# Patient Record
Sex: FEMALE | Race: BLACK OR AFRICAN AMERICAN | Employment: FULL TIME | URBAN - METROPOLITAN AREA
[De-identification: names, ages, dates, MRNs, and addresses within clinical notes are randomized per-mention and may not be internally consistent; named-entity substitution may affect disease eponyms.]

---

## 2017-04-07 ENCOUNTER — OFFICE VISIT (OUTPATIENT)
Dept: NEUROLOGY | Age: 32
End: 2017-04-07

## 2017-04-07 VITALS
HEART RATE: 73 BPM | OXYGEN SATURATION: 98 % | SYSTOLIC BLOOD PRESSURE: 130 MMHG | DIASTOLIC BLOOD PRESSURE: 84 MMHG | WEIGHT: 265 LBS

## 2017-04-07 DIAGNOSIS — G60.3 IDIOPATHIC PROGRESSIVE NEUROPATHY: ICD-10-CM

## 2017-04-07 DIAGNOSIS — E55.9 VITAMIN D DEFICIENCY: ICD-10-CM

## 2017-04-07 DIAGNOSIS — N30.00 ACUTE CYSTITIS WITHOUT HEMATURIA: ICD-10-CM

## 2017-04-07 DIAGNOSIS — G35 MULTIPLE SCLEROSIS (HCC): Primary | ICD-10-CM

## 2017-04-07 DIAGNOSIS — E53.8 B12 DEFICIENCY: ICD-10-CM

## 2017-04-07 DIAGNOSIS — M32.19 OTHER SYSTEMIC LUPUS ERYTHEMATOSUS WITH OTHER ORGAN INVOLVEMENT (HCC): ICD-10-CM

## 2017-04-07 NOTE — MR AVS SNAPSHOT
Visit Information Date & Time Provider Department Dept. Phone Encounter #  
 4/7/2017 10:00 AM Patricio Elena MD Neurology Clinic at [facility] [phone] [id number] Follow-up Instructions Return in about 3 weeks (around 4/28/2017) for EMG. Upcoming Health Maintenance Date Due DTaP/Tdap/Td series (1 - Tdap) 3/9/2005 PAP AKA CERVICAL CYTOLOGY 3/9/2005 INFLUENZA AGE 9 TO ADULT 8/1/2016 Allergies as of 4/7/2017  Review Complete On: 4/7/2017 By: Reynaldo Blood Not on File Current Immunizations  Never Reviewed No immunizations on file. Not reviewed this visit You Were Diagnosed With   
  
 Codes Comments Multiple sclerosis ([facility].)    -  Primary ICD-10-CM: G35 
ICD-9-CM: 403 Other systemic lupus erythematosus with other organ involvement ([facility]     ICD-10-CM: M32.19   
 B12 deficiency     ICD-10-CM: E53.8 ICD-9-CM: 266.2 Vitamin D deficiency     ICD-10-CM: E55.9 ICD-9-CM: 268.9 Idiopathic progressive neuropathy     ICD-10-CM: G60.3 ICD-9-CM: 356.4 Acute cystitis without hematuria     ICD-10-CM: N30.00 ICD-9-CM: 595.0 Vitals BP Pulse Weight(growth percentile) SpO2 Smoking Status 130/84 73 265 lb (120.2 kg) 98% Former Smoker Preferred Pharmacy Pharmacy Name Phone [facility] [address] [facility] [phone] Your Updated Medication List  
  
   
This list is accurate as of: 4/7/17 11:09 AM.  Always use your most recent med list.  
  
  
  
  
 Leonardo Hug Take  by mouth. SPRINTEC (28) PO Take  by mouth. ZYRTEC PO Take  by mouth. We Performed the Following KANDACE, DIRECT, W/REFLEX [id number] CPT(R)] REFERRAL TO NEUROLOGY [MRW49 Custom] Comments:  
 Please do EMG/NCS of right/left arm/leg to eval for neuropathy or radiculopathy URINALYSIS W/ RFLX MICROSCOPIC [79204 CPT(R)] VITAMIN B12 & FOLATE [70705 CPT(R)] VITAMIN D, 25 HYDROXY [id number] CPT(R)] Follow-up Instructions Return in about 3 weeks (around 4/28/2017) for EMG. To-Do List   
 04/07/2017 Neurology:  EMG NCV MOTOR WITH F/WAVE PER NERVE   
  
 04/07/2017 Imaging:  MRI BRAIN W WO CONT   
  
 04/07/2017 Imaging:  MRI CERV SPINE W WO CONT Referral Information Referral ID Referred By Referred To  
  
 [id number] Migdalia LEIVA MD   
   [address] [facility] [place] [address] [address] , [address] Phone: [phone] Fax: [phone] Visits Status Start Date End Date 1 New Request 4/7/17 4/7/18 If your referral has a status of pending review or denied, additional information will be sent to support the outcome of this decision. Patient Instructions A Healthy Lifestyle: Care Instructions Your Care Instructions A healthy lifestyle can help you feel good, stay at a healthy weight, and have plenty of energy for both work and play. A healthy lifestyle is something you can share with your whole family. A healthy lifestyle also can lower your risk for serious health problems, such as high blood pressure, heart disease, and diabetes. You can follow a few steps listed below to improve your health and the health of your family. Follow-up care is a key part of your treatment and safety. Be sure to make and go to all appointments, and call your doctor if you are having problems. Its also a good idea to know your test results and keep a list of the medicines you take. How can you care for yourself at home? · Do not eat too much sugar, fat, or fast foods. You can still have dessert and treats now and then. The goal is moderation. · Start small to improve your eating habits. Pay attention to portion sizes, drink less juice and soda pop, and eat more fruits and vegetables. ¨ Eat a healthy amount of food. A 3-ounce serving of meat, for example, is about the size of a deck of cards. Fill the rest of your plate with vegetables and whole grains. ¨ Limit the amount of soda and sports drinks you have every day. Drink more water when you are thirsty. ¨ Eat at least 5 servings of fruits and vegetables every day. It may seem like a lot, but it is not hard to reach this goal. A serving or helping is 1 piece of fruit, 1 cup of vegetables, or 2 cups of leafy, raw vegetables. Have an apple or some carrot sticks as an afternoon snack instead of a candy bar. Try to have fruits and/or vegetables at every meal. 
· Make exercise part of your daily routine. You may want to start with simple activities, such as walking, bicycling, or slow swimming. Try to be active 30 to 60 minutes every day. You do not need to do all 30 to 60 minutes all at once. For example, you can exercise 3 times a day for 10 or 20 minutes. Moderate exercise is safe for most people, but it is always a good idea to talk to your doctor before starting an exercise program. 
· Keep moving. [place] [employer] the lawn, work in the garden, or [employer]. Take the stairs instead of the elevator at work. · If you smoke, quit. People who smoke have an increased risk for heart attack, stroke, cancer, and other lung illnesses. Quitting is hard, but there are ways to boost your chance of quitting tobacco for good. ¨ Use nicotine gum, patches, or lozenges. ¨ Ask your doctor about stop-smoking programs and medicines. ¨ Keep trying. In addition to reducing your risk of diseases in the future, you will notice some benefits soon after you stop using tobacco. If you have shortness of breath or asthma symptoms, they will likely get better within a few weeks after you quit. · Limit how much alcohol you drink. Moderate amounts of alcohol (up to 2 drinks a day for men, 1 drink a day for women) are okay.  But drinking too much can lead to liver problems, high blood pressure, and other health problems. Family health If you have a family, there are many things you can do together to improve your health. · Eat meals together as a family as often as possible. · Eat healthy foods. This includes fruits, vegetables, lean meats and dairy, and whole grains. · Include your family in your fitness plan. Most people think of activities such as jogging or tennis as the way to fitness, but there are many ways you and your family can be more active. Anything that makes you breathe hard and gets your heart pumping is exercise. Here are some tips: 
¨ Walk to do errands or to take your child to school or the bus. ¨ Go for a family bike ride after dinner instead of watching TV. Where can you learn more? Go to http://howie-sherin.info/. Enter E999 in the search box to learn more about \"A Healthy Lifestyle: Care Instructions. \" Current as of: July 26, 2016 Content Version: 11.2 © 0954-4908 [employer]. Care instructions adapted under license by [employer] (which disclaims liability or warranty for this information). If you have questions about a medical condition or this instruction, always ask your healthcare professional. [place] [address] any warranty or liability for your use of this information. Introducing [facility] & HEALTH SERVICES! [facility] introduces [employer] patient portal. Now you can access parts of your medical record, email your doctor's office, and request medication refills online. 1. In your internet browser, go to [email or web address] 2. Click on the First Time User? Click Here link in the Sign In box. You will see the New Member Sign Up page. 3. Enter your [employer] Access Code exactly as it appears below. You will not need to use this code after youve completed the sign-up process.  If you do not sign up before the expiration date, you must request a new code. · Sessions Access Code: [email or web address] Expires: 7/6/2017  9:47 AM 
 
4. Enter the last four digits of your Social Security Number (xxxx) and Date of Birth (mm/dd/yyyy) as indicated and click Submit. You will be taken to the next sign-up page. 5. Create a Sessions ID. This will be your Sessions login ID and cannot be changed, so think of one that is secure and easy to remember. 6. Create a Sessions password. You can change your password at any time. 7. Enter your Password Reset Question and Answer. This can be used at a later time if you forget your password. 8. Enter your e-mail address. You will receive e-mail notification when new information is available in [address] 9. Click Sign Up. You can now view and download portions of your medical record. 10. Click the Download Summary menu link to download a portable copy of your medical information. If you have questions, please visit the Frequently Asked Questions section of the Sessions website. Remember, Sessions is NOT to be used for urgent needs. For medical emergencies, dial 911. Now available from your iPhone and Android! Please provide this summary of care documentation to your next provider. If you have any questions after today's visit, please call [phone].

## 2017-04-07 NOTE — PATIENT INSTRUCTIONS

## 2017-04-07 NOTE — PROGRESS NOTES
NEUROLOGY HISTORY AND PHYSICAL    Name Johnathan Vela Age 34 y.o. MRN [id number]  1985       Chief Complaint: sensory loss     This is a pleasant  34 y.o. right handed female with a history of transverse myelitis which occurred in  and presented as extreme fatigue and weakness. She was unable to raise arms and could not walk. Went to [facility] . Received MRI at spinal tap and found to have transverse myelitis. Recovered over 5 days. She was treated with solumederol at the time. She is now complaining of a burning sensation in the upper left leg. She also has been having urinary incontinence at times. She sometimes has to go to the bathroom four or five times a night. She additionally has lower extremity sensory loss. She was recently tested for lyme disease. I have personally reviewed the chart   I have personally reviewed available imaging   I have the reviewed the available laboratory results. Assessment and Plan     1. Multiple sclerosis (HCC)  Condition is not clear. Usually transverse myelitis does not follow this patterns of presentation which is more typical of MS  - MRI BRAIN W WO CONT; Future  - MRI CERV SPINE W WO CONT; Future    2. Other systemic lupus erythematosus with other organ involvement (HCC)  - KANDACE, DIRECT, W/REFLEX    3. B12 deficiency  - VITAMIN B12 & FOLATE    4. Vitamin D deficiency  - VITAMIN D, 25 HYDROXY    5. Idiopathic progressive neuropathy  - REFERRAL TO NEUROLOGY  - EMG NCV MOTOR WITH F/WAVE PER NERVE; Future    6. Acute cystitis without hematuria    - URINALYSIS W/ RFLX MICROSCOPIC      Patient Allergies    Review of patient's allergies indicates not on file. Current Outpatient Prescriptions   Medication Sig    NORGESTIMATE-ETHINYL ESTRADIOL (SPRINTEC, 28, PO) Take  by mouth.  CETIRIZINE HCL (ZYRTEC PO) Take  by mouth.  ESCITALOPRAM OXALATE (LEXAPRO PO) Take  by mouth. No current facility-administered medications for this visit.         Past Medical History:   Diagnosis Date    Anxiety     Fatigue     Hearing loss     Incontinence     Joint pain     Migraine     Muscle pain     Muscle weakness     Ringing in the ears        Social History   Substance Use Topics    Smoking status: Former Smoker    Smokeless tobacco: Not on file    Alcohol use Yes       Family History  No multiple sclerosis  No seizures. Review of Systems   Constitutional: Negative for chills and fever. HENT: Negative for ear pain. Eyes: Negative for pain and discharge. Respiratory: Negative for cough and hemoptysis. Cardiovascular: Negative for chest pain and claudication. Gastrointestinal: Negative for constipation and diarrhea. Genitourinary: Positive for urgency. Negative for flank pain and hematuria. Musculoskeletal: Negative for back pain and myalgias. Skin: Negative for itching and rash. Neurological: Positive for tingling and sensory change. Negative for headaches. Endo/Heme/Allergies: Negative for environmental allergies. Does not bruise/bleed easily. Psychiatric/Behavioral: Negative for depression and hallucinations. Visit Vitals    /84    Pulse 73    Wt 265 lb (120.2 kg)    SpO2 98%      Physical Exam   Constitutional: She is oriented to person, place, and time and well-developed, well-nourished, and in no distress. HENT:   Head: Normocephalic and atraumatic. Eyes: Conjunctivae and EOM are normal. Pupils are equal, round, and reactive to light. Neck: Neck supple. No JVD present. Carotid bruit is not present. No thyromegaly present. Cardiovascular: Normal rate, regular rhythm and normal heart sounds. Pulmonary/Chest: Effort normal and breath sounds normal. No respiratory distress. She has no wheezes. She has no rales. Abdominal: Soft. Bowel sounds are normal. She exhibits no distension. There is no tenderness. Neurological: She is alert and oriented to person, place, and time.  She has normal strength, normal reflexes and intact cranial nerves. Gait normal.   Eyes: PERRLA, Conjugate eye movements with no Nystagmus or ptosis  Sensory :Preceives crude touch and vibration in a symmetric fashion in proximal and distal limbs. Gait is well balanced with good arm swing. Skin: No rash noted. No erythema.

## 2017-04-25 LAB
APPEARANCE UR: CLEAR
BACTERIA #/AREA URNS HPF: NORMAL /[HPF]
BILIRUB UR QL STRIP: NEGATIVE
CASTS URNS QL MICRO: NORMAL /LPF
COLOR UR: YELLOW
EPI CELLS #/AREA URNS HPF: NORMAL /HPF
GLUCOSE UR QL: NEGATIVE
HGB UR QL STRIP: ABNORMAL
KETONES UR QL STRIP: NEGATIVE
LEUKOCYTE ESTERASE UR QL STRIP: NEGATIVE
MICRO URNS: ABNORMAL
MUCOUS THREADS URNS QL MICRO: PRESENT
NITRITE UR QL STRIP: NEGATIVE
PH UR STRIP: 6.5 [PH] (ref 5–7.5)
PROT UR QL STRIP: NEGATIVE
RBC #/AREA URNS HPF: NORMAL /HPF
SP GR UR: 1.01 (ref 1–1.03)
UROBILINOGEN UR STRIP-MCNC: 0.2 MG/DL (ref 0.2–1)
WBC #/AREA URNS HPF: NORMAL /HPF

## 2017-04-26 LAB
25(OH)D3+25(OH)D2 SERPL-MCNC: 21.9 NG/ML (ref 30–100)
ANA SER QL: NEGATIVE
FOLATE SERPL-MCNC: >20 NG/ML
VIT B12 SERPL-MCNC: 223 PG/ML (ref 211–946)

## 2017-07-10 ENCOUNTER — OFFICE VISIT (OUTPATIENT)
Dept: NEUROLOGY | Age: 32
End: 2017-07-10

## 2017-07-10 VITALS — SYSTOLIC BLOOD PRESSURE: 121 MMHG | DIASTOLIC BLOOD PRESSURE: 76 MMHG | HEART RATE: 75 BPM | OXYGEN SATURATION: 96 %

## 2017-07-10 DIAGNOSIS — G37.3 ACUTE TRANSVERSE MYELITIS (HCC): Primary | ICD-10-CM

## 2017-07-10 NOTE — MR AVS SNAPSHOT
Visit Information Date & Time Provider Department Dept. Phone Encounter #  
 7/10/2017  8:00 AM Sebastien Pemberton MD Neurology Clinic at [facility] [phone] [id number] Follow-up Instructions Return in about 2 months (around 9/10/2017) for transverse myelitis. Upcoming Health Maintenance Date Due DTaP/Tdap/Td series (1 - Tdap) 3/9/2005 PAP AKA CERVICAL CYTOLOGY 3/9/2005 INFLUENZA AGE 9 TO ADULT 8/1/2017 Allergies as of 7/10/2017  Review Complete On: 7/10/2017 By: Sebastien Pemberton MD  
 Not on File Current Immunizations  Never Reviewed No immunizations on file. Not reviewed this visit You Were Diagnosed With   
  
 Codes Comments [facility] myel[facility] ([facility]    -  Primary ICD-10-CM: G37.3 ICD-9-CM: 341.20 Vitals Smoking Status Former Smoker Preferred Pharmacy Pharmacy Name Phone Eduardo Banks [facility] [phone] Your Updated Medication List  
  
   
This list is accurate as of: 7/10/17  9:06 AM.  Always use your most recent med list.  
  
  
  
  
 Traore Ronde Take  by mouth. SPRINTEC (28) PO Take  by mouth. ZYRTEC PO Take  by mouth. Follow-up Instructions Return in about 2 months (around 9/10/2017) for transverse myelitis. Introducing [facility] & HEALTH SERVICES! Kassy Nath introduces [employer] patient portal. Now you can access parts of your medical record, email your doctor's office, and request medication refills online. 1. In your internet browser, go to [email or web address] 2. Click on the First Time User? Click Here link in the Sign In box. You will see the New Member Sign Up page. 3. Enter your [employer] Access Code exactly as it appears below. You will not need to use this code after youve completed the sign-up process.  If you do not sign up before the expiration date, you must request a new code. · [employer] Access Code: RACIEL MACIAS [facility] Expires: 10/8/2017  9:06 AM 
 
4. Enter the last four digits of your Social Security Number (xxxx) and Date of Birth (mm/dd/yyyy) as indicated and click Submit. You will be taken to the next sign-up page. 5. Create a [employer] ID. This will be your [employer] login ID and cannot be changed, so think of one that is secure and easy to remember. 6. Create a [employer] password. You can change your password at any time. 7. Enter your Password Reset Question and Answer. This can be used at a later time if you forget your password. 8. Enter your e-mail address. You will receive e-mail notification when new information is available in [address] 9. Click Sign Up. You can now view and download portions of your medical record. 10. Click the Download Summary menu link to download a portable copy of your medical information. If you have questions, please visit the Frequently Asked Questions section of the [employer] website. Remember, [employer] is NOT to be used for urgent needs. For medical emergencies, dial 911. Now available from your iPhone and Android! Please provide this summary of care documentation to your next provider. If you have any questions after today's visit, please call [phone].

## 2017-07-10 NOTE — PROCEDURES
[facility] Neurology  [address] [facility]    Electrodiagnostic Study Report    Test Date:  7/10/2017    Patient: Anastacio Chau : 1985 Physician: Hallie Kingsley M.D.   ID#: [id number] SEX: Female Ref. Phys: Hallie Kingsley M.D. Patient History / Exam:  Burning in upper left leg and lower extremity sensory loss, r/o neuropathy    EMG & NCV Findings:  Evaluation of the right Fibular motor nerve showed normal distal onset latency (4.0 ms), normal amplitude (5.3 mV), normal conduction velocity (B Fib-Ankle, 49 m/s), and normal conduction velocity (Poplt-B Fib, 56 m/s). The right median motor nerve showed normal distal onset latency (3.4 ms), normal amplitude (11.1 mV), and normal conduction velocity (Elbow-Wrist, 56 m/s). The right tibial motor nerve showed normal distal onset latency (3.4 ms), normal amplitude (10.0 mV), and normal conduction velocity (Knee-Ankle, 42 m/s). The right ulnar motor nerve showed normal distal onset latency (2.5 ms), normal amplitude (7.9 mV), normal conduction velocity (B Elbow-Wrist, 63 m/s), and normal conduction velocity (A Elbow-B Elbow, 59 m/s). The right median sensory, the right radial sensory, the right sural sensory, and the right ulnar sensory nerves showed normal distal peak latency (R3.1, R2.2, R3.4, R2.5 ms) and normal amplitude (R66.7, R41.2, R21.5, R65.1 µV). The right Sup Fibular sensory nerve showed normal distal peak latency (2.4 ms), normal amplitude (30.7 µV), and normal conduction velocity (Lower leg-Lat ankle, 56 m/s). All examined muscles (as indicated in the following table) showed no evidence of electrical instability.       Impression  Normal Study        ___________________________  Hallie Kingsley M.D.        Nerve Conduction Studies  Anti Sensory Summary Table     Stim Site NR Peak (ms) Norm Peak (ms) P-T Amp (µV) Norm P-T Amp Site1 Site2 Dist (cm)   Right Median Anti Sensory (2nd Digit)  34.4°C   Wrist    3.1 <4 66.7 >13 Wrist 2nd Digit 14.0   Right Radial Anti Sensory (Base 1st Digit)  33.6°C   Wrist    2.2 <2.8 41.2 >11 Wrist Base 1st Digit 10.0   Right Sup Fibular Anti Sensory (Lat ankle)  31.5°C   Lower leg    2.4 <4.5 30.7 >5 Lower leg Lat ankle 10.0   Right Sural Anti Sensory (Lat Mall)  31.3°C   Calf    3.4 <4.5 21.5 >4.0 Calf Lat Mall 14.0   Right Ulnar Anti Sensory (5th Digit)  33.9°C   Wrist    2.5 <4.0 65.1 >9 Wrist 5th Digit 14.0     Motor Summary Table     Stim Site NR Onset (ms) Norm Onset (ms) O-P Amp (mV) Norm O-P Amp P-T Amp (mV) Site1 Site2 Dist (cm) Santana (m/s)   Right Fibular Motor (Ext Dig Brev)  31.3°C   Ankle    4.0 <6.5 5.3 >2.6 8.0 Ankle Ext Dig Brev 8.0    B Fib    10.3  5.2  7.4 B Fib Ankle 31.0 49   Poplt    12.1  4.9  7.0 Poplt B Fib 10.0 56   Right Median Motor (Abd Poll Brev)  33.6°C   Wrist    3.4 <4.5 11.1 >4.1 18.2 Wrist Abd Poll Brev 8.0    Elbow    7.9  10.3  16.7 Elbow Wrist 25.0 56   Right Tibial Motor (Abd Son Brev)  31.6°C   Ankle    3.4 <6.1 10.0 >5.3 17.0 Ankle Abd Son Brev 8.0    Knee    12.4  9.2  15.0 Knee Ankle 37.5 42   Right Ulnar Motor (Abd Dig Minimi)  34.1°C   Wrist    2.5 <3.1 7.9 >7.0 14.3 Wrist Abd Dig Minimi 8.0    B Elbow    5.6  7.6  13.3 B Elbow Wrist 19.5 63   A Elbow    7.3  7.5  13.3 A Elbow B Elbow 10.0 59       EMG     Side Muscle Nerve Root Ins Act Fibs Psw Recrt Duration Amp Poly Comment   Right Ext Dig Brev Dp Br Peron L5, S1 Nml Nml Nml Nml Nml Nml Nml    Right AbdHallucis MedPlantar S1-2 Nml Nml Nml Nml Nml Nml Nml    Right AntTibialis Dp Br Peron L4-5 Nml Nml Nml Nml Nml Nml Nml    Right MedGastroc Tibial S1-2 Nml Nml Nml Nml Nml Nml Nml    Right VastusLat Femoral L2-4 Nml Nml Nml Nml Nml Nml Nml          Waveforms:

## 2020-01-30 ENCOUNTER — HOSPITAL ENCOUNTER (EMERGENCY)
Age: 35
Discharge: STILL ADMITTED OR ADMINISTRATIVE | End: 2020-01-30
Payer: COMMERCIAL

## 2020-01-30 ENCOUNTER — HOSPITAL ENCOUNTER (INPATIENT)
Age: 35
LOS: 2 days | Discharge: HOME | DRG: 123 | End: 2020-02-01
Attending: EMERGENCY MEDICINE | Admitting: INTERNAL MEDICINE
Payer: COMMERCIAL

## 2020-01-30 ENCOUNTER — APPOINTMENT (OUTPATIENT)
Dept: MRI IMAGING | Age: 35
DRG: 123 | End: 2020-01-30
Attending: EMERGENCY MEDICINE
Payer: COMMERCIAL

## 2020-01-30 DIAGNOSIS — H47.10 PAPILLEDEMA OF LEFT EYE: ICD-10-CM

## 2020-01-30 DIAGNOSIS — H54.62 VISION LOSS OF LEFT EYE: Primary | ICD-10-CM

## 2020-01-30 DIAGNOSIS — G37.3 IDIOPATHIC TRANSVERSE MYELITIS (HCC): ICD-10-CM

## 2020-01-30 LAB
ALBUMIN SERPL-MCNC: 3.4 G/DL (ref 3.5–5)
ALBUMIN/GLOB SERPL: 0.8 {RATIO} (ref 1.1–2.2)
ALP SERPL-CCNC: 84 U/L (ref 45–117)
ALT SERPL-CCNC: 35 U/L (ref 12–78)
ANION GAP SERPL CALC-SCNC: 6 MMOL/L (ref 5–15)
AST SERPL-CCNC: 34 U/L (ref 15–37)
BASOPHILS # BLD: 0.1 K/UL (ref 0–0.1)
BASOPHILS NFR BLD: 1 % (ref 0–1)
BILIRUB SERPL-MCNC: 0.4 MG/DL (ref 0.2–1)
BUN SERPL-MCNC: 8 MG/DL (ref 6–20)
BUN/CREAT SERPL: 11 (ref 12–20)
CALCIUM SERPL-MCNC: 8.6 MG/DL (ref 8.5–10.1)
CHLORIDE SERPL-SCNC: 105 MMOL/L (ref 97–108)
CO2 SERPL-SCNC: 25 MMOL/L (ref 21–32)
COMMENT, HOLDF: NORMAL
CREAT SERPL-MCNC: 0.75 MG/DL (ref 0.55–1.02)
DIFFERENTIAL METHOD BLD: NORMAL
EOSINOPHIL # BLD: 0.1 K/UL (ref 0–0.4)
EOSINOPHIL NFR BLD: 2 % (ref 0–7)
ERYTHROCYTE [DISTWIDTH] IN BLOOD BY AUTOMATED COUNT: 12.4 % (ref 11.5–14.5)
ERYTHROCYTE [SEDIMENTATION RATE] IN BLOOD: 2 MM/HR (ref 0–20)
EST. AVERAGE GLUCOSE BLD GHB EST-MCNC: 97 MG/DL
GLOBULIN SER CALC-MCNC: 4.1 G/DL (ref 2–4)
GLUCOSE BLD STRIP.AUTO-MCNC: 100 MG/DL (ref 65–100)
GLUCOSE SERPL-MCNC: 81 MG/DL (ref 65–100)
HBA1C MFR BLD: 5 % (ref 4–5.6)
HCG SERPL QL: NEGATIVE
HCT VFR BLD AUTO: 39.2 % (ref 35–47)
HGB BLD-MCNC: 12.9 G/DL (ref 11.5–16)
IMM GRANULOCYTES # BLD AUTO: 0 K/UL (ref 0–0.04)
IMM GRANULOCYTES NFR BLD AUTO: 0 % (ref 0–0.5)
LYMPHOCYTES # BLD: 2.2 K/UL (ref 0.8–3.5)
LYMPHOCYTES NFR BLD: 32 % (ref 12–49)
MCH RBC QN AUTO: 30.1 PG (ref 26–34)
MCHC RBC AUTO-ENTMCNC: 32.9 G/DL (ref 30–36.5)
MCV RBC AUTO: 91.4 FL (ref 80–99)
MONOCYTES # BLD: 0.5 K/UL (ref 0–1)
MONOCYTES NFR BLD: 8 % (ref 5–13)
NEUTS SEG # BLD: 3.9 K/UL (ref 1.8–8)
NEUTS SEG NFR BLD: 57 % (ref 32–75)
NRBC # BLD: 0 K/UL (ref 0–0.01)
NRBC BLD-RTO: 0 PER 100 WBC
PLATELET # BLD AUTO: 186 K/UL (ref 150–400)
PMV BLD AUTO: 10.7 FL (ref 8.9–12.9)
POTASSIUM SERPL-SCNC: 4.1 MMOL/L (ref 3.5–5.1)
PROT SERPL-MCNC: 7.5 G/DL (ref 6.4–8.2)
RBC # BLD AUTO: 4.29 M/UL (ref 3.8–5.2)
RBC MORPH BLD: NORMAL
SAMPLES BEING HELD,HOLD: NORMAL
SERVICE CMNT-IMP: NORMAL
SODIUM SERPL-SCNC: 136 MMOL/L (ref 136–145)
WBC # BLD AUTO: 6.8 K/UL (ref 3.6–11)

## 2020-01-30 PROCEDURE — 83520 IMMUNOASSAY QUANT NOS NONAB: CPT

## 2020-01-30 PROCEDURE — 70553 MRI BRAIN STEM W/O & W/DYE: CPT

## 2020-01-30 PROCEDURE — 74011250637 HC RX REV CODE- 250/637: Performed by: INTERNAL MEDICINE

## 2020-01-30 PROCEDURE — A9575 INJ GADOTERATE MEGLUMI 0.1ML: HCPCS | Performed by: EMERGENCY MEDICINE

## 2020-01-30 PROCEDURE — 80053 COMPREHEN METABOLIC PANEL: CPT

## 2020-01-30 PROCEDURE — 74011250636 HC RX REV CODE- 250/636: Performed by: INTERNAL MEDICINE

## 2020-01-30 PROCEDURE — 85652 RBC SED RATE AUTOMATED: CPT

## 2020-01-30 PROCEDURE — 74011000258 HC RX REV CODE- 258: Performed by: INTERNAL MEDICINE

## 2020-01-30 PROCEDURE — 82164 ANGIOTENSIN I ENZYME TEST: CPT

## 2020-01-30 PROCEDURE — 84703 CHORIONIC GONADOTROPIN ASSAY: CPT

## 2020-01-30 PROCEDURE — 99284 EMERGENCY DEPT VISIT MOD MDM: CPT

## 2020-01-30 PROCEDURE — 65660000000 HC RM CCU STEPDOWN

## 2020-01-30 PROCEDURE — 74011250636 HC RX REV CODE- 250/636: Performed by: EMERGENCY MEDICINE

## 2020-01-30 PROCEDURE — 85025 COMPLETE CBC W/AUTO DIFF WBC: CPT

## 2020-01-30 PROCEDURE — 75810000275 HC EMERGENCY DEPT VISIT NO LEVEL OF CARE

## 2020-01-30 PROCEDURE — 82962 GLUCOSE BLOOD TEST: CPT

## 2020-01-30 PROCEDURE — 36415 COLL VENOUS BLD VENIPUNCTURE: CPT

## 2020-01-30 PROCEDURE — 83036 HEMOGLOBIN GLYCOSYLATED A1C: CPT

## 2020-01-30 RX ORDER — ESCITALOPRAM OXALATE 10 MG/1
20 TABLET ORAL DAILY
COMMUNITY

## 2020-01-30 RX ORDER — SODIUM CHLORIDE 9 MG/ML
100 INJECTION, SOLUTION INTRAVENOUS CONTINUOUS
Status: DISCONTINUED | OUTPATIENT
Start: 2020-01-30 | End: 2020-02-01 | Stop reason: HOSPADM

## 2020-01-30 RX ORDER — MAGNESIUM SULFATE 100 %
4 CRYSTALS MISCELLANEOUS AS NEEDED
Status: DISCONTINUED | OUTPATIENT
Start: 2020-01-30 | End: 2020-02-01 | Stop reason: HOSPADM

## 2020-01-30 RX ORDER — GADOTERATE MEGLUMINE 376.9 MG/ML
20 INJECTION INTRAVENOUS
Status: COMPLETED | OUTPATIENT
Start: 2020-01-30 | End: 2020-01-30

## 2020-01-30 RX ORDER — NORGESTIMATE AND ETHINYL ESTRADIOL 0.25-0.035
1 KIT ORAL DAILY
COMMUNITY

## 2020-01-30 RX ORDER — INSULIN LISPRO 100 [IU]/ML
INJECTION, SOLUTION INTRAVENOUS; SUBCUTANEOUS
Status: DISCONTINUED | OUTPATIENT
Start: 2020-01-30 | End: 2020-02-01 | Stop reason: HOSPADM

## 2020-01-30 RX ORDER — FAMOTIDINE 20 MG/1
20 TABLET, FILM COATED ORAL 2 TIMES DAILY
Status: DISCONTINUED | OUTPATIENT
Start: 2020-01-30 | End: 2020-02-01 | Stop reason: HOSPADM

## 2020-01-30 RX ORDER — CETIRIZINE HCL 10 MG
10 TABLET ORAL DAILY
COMMUNITY

## 2020-01-30 RX ORDER — SODIUM CHLORIDE 0.9 % (FLUSH) 0.9 %
10 SYRINGE (ML) INJECTION
Status: COMPLETED | OUTPATIENT
Start: 2020-01-30 | End: 2020-01-30

## 2020-01-30 RX ORDER — ONDANSETRON 2 MG/ML
4 INJECTION INTRAMUSCULAR; INTRAVENOUS
Status: DISCONTINUED | OUTPATIENT
Start: 2020-01-30 | End: 2020-02-01 | Stop reason: HOSPADM

## 2020-01-30 RX ORDER — SODIUM CHLORIDE 0.9 % (FLUSH) 0.9 %
5-40 SYRINGE (ML) INJECTION AS NEEDED
Status: DISCONTINUED | OUTPATIENT
Start: 2020-01-30 | End: 2020-02-01 | Stop reason: HOSPADM

## 2020-01-30 RX ORDER — SODIUM CHLORIDE 0.9 % (FLUSH) 0.9 %
5-40 SYRINGE (ML) INJECTION EVERY 8 HOURS
Status: DISCONTINUED | OUTPATIENT
Start: 2020-01-30 | End: 2020-02-01 | Stop reason: HOSPADM

## 2020-01-30 RX ORDER — DEXTROSE 50 % IN WATER (D50W) INTRAVENOUS SYRINGE
25-50 AS NEEDED
Status: DISCONTINUED | OUTPATIENT
Start: 2020-01-30 | End: 2020-01-30 | Stop reason: RX

## 2020-01-30 RX ORDER — ACETAMINOPHEN 325 MG/1
650 TABLET ORAL
Status: DISCONTINUED | OUTPATIENT
Start: 2020-01-30 | End: 2020-02-01 | Stop reason: HOSPADM

## 2020-01-30 RX ADMIN — GADOTERATE MEGLUMINE 20 ML: 376.9 INJECTION INTRAVENOUS at 18:02

## 2020-01-30 RX ADMIN — FAMOTIDINE 20 MG: 20 TABLET ORAL at 22:07

## 2020-01-30 RX ADMIN — SODIUM CHLORIDE 100 ML/HR: 900 INJECTION, SOLUTION INTRAVENOUS at 22:13

## 2020-01-30 RX ADMIN — Medication 10 ML: at 22:13

## 2020-01-30 RX ADMIN — SODIUM CHLORIDE 1000 MG: 900 INJECTION, SOLUTION INTRAVENOUS at 22:09

## 2020-01-30 RX ADMIN — Medication 10 ML: at 18:02

## 2020-01-30 NOTE — ED PROVIDER NOTES
HPI     26 yo female with history of transverse myelitis and others referred from ophthalmology office Dr. Ayla Miller for MRI and further work-up. Patient states that she has had left eye vision changes with pain x 2 weeks. Right eye has been dead for many years related to the transverse myelitis. Today was her first time seeing the ophthalmologist.  Denies headache, other focal weakness or numbness, fevers or other complaints. Dr. Henry Florian - past neurology. SHX:  Former smoker. + ETOH. Past Medical History:   Diagnosis Date    Acute transverse myelitis (HCC)     Anxiety     Fatigue     Hearing loss     Incontinence     Joint pain     Migraine     Muscle pain     Muscle weakness     Ringing in the ears        No past surgical history on file. No family history on file. Social History     Socioeconomic History    Marital status: SINGLE     Spouse name: Not on file    Number of children: Not on file    Years of education: Not on file    Highest education level: Not on file   Occupational History    Not on file   Social Needs    Financial resource strain: Not on file    Food insecurity:     Worry: Not on file     Inability: Not on file    Transportation needs:     Medical: Not on file     Non-medical: Not on file   Tobacco Use    Smoking status: Former Smoker    Smokeless tobacco: Never Used   Substance and Sexual Activity    Alcohol use:  Yes    Drug use: Yes     Types: Marijuana    Sexual activity: Yes     Birth control/protection: Pill   Lifestyle    Physical activity:     Days per week: Not on file     Minutes per session: Not on file    Stress: Not on file   Relationships    Social connections:     Talks on phone: Not on file     Gets together: Not on file     Attends [employer] service: Not on file     Active member of club or organization: Not on file     Attends meetings of clubs or organizations: Not on file     Relationship status: Not on file    Intimate partner violence:     Fear of current or ex partner: Not on file     Emotionally abused: Not on file     Physically abused: Not on file     Forced sexual activity: Not on file   Other Topics Concern    Not on file   Social History Narrative    Not on file         ALLERGIES: Patient has no known allergies. Review of Systems   Constitutional: Negative for fever. Eyes: Positive for pain and visual disturbance. Gastrointestinal: Negative for vomiting. All other systems reviewed and are negative. Vitals:    01/30/20 1412   BP: 143/88   Pulse: 97   Resp: 16   Temp: 98.8 °F (37.1 °C)   SpO2: 99%            Physical Exam     Nursing note and vitals reviewed. Constitutional: appears well-developed and well-nourished. No distress. HENT:   Head: Normocephalic and atraumatic. Sclera anicteric  Nose: No rhinorrhea  Mouth/Throat: Oropharynx is clear and moist. Pharynx normal  Eyes: Conjunctivae are normal. Pupils are equal, round, and reactive to light. Right eye exhibits no discharge. Left eye exhibits no discharge. No scleral icterus. Neck: Painless normal range of motion. Supple  Cardiovascular: Normal rate, regular rhythm, normal heart sounds and intact distal pulses. Exam reveals no gallop and no friction rub. No murmur heard. Pulmonary/Chest: Effort normal and breath sounds normal. No respiratory distress. no wheezes. no rales. Abdominal: Soft. Bowel sounds are normal. Exhibits no distension and no mass. No tenderness. No guarding. Musculoskeletal: Normal range of motion. no tenderness. No edema  Lymphadenopathy:   No cervical adenopathy. Neurological:  Alert and oriented to person, place, and time. Coordination normal. CN 2-12 intact except vision in right eye. Moving all extremities. Skin: Skin is warm and dry. No rash noted. No pallor. MDM     Referred from ophthalmologist for MRI and labs. Optic neuropathy is a diagnosis by ophthalmology.   Ordered MRI and labs as requested by ophthalmologist.  Procedures      5:14 PM  D/W Dr. Hazel Santillan, optho. He called the ED. He states pt has papilledema of left eye and right optic nerve is atrophied. I updated him on the results. Pending MRI. He agrees with probable admission and neurology to see. 7:13 PM  Updated patient on results. Spoke with Dr. Miguelina Jimenez.  I reviewed the hx, exam and results with him. Dr. Siobhan Dominguez, hospitalist present. He gave recommendations to hospitalist.  Updated pt that she will be admitted. Hospitalist Rehan Serve for Admission  7:41 PM    ED Room Number: R31/R31  Patient Name and age:  Soniya Sosa 27 y.o.  female  Working Diagnosis:   1. Vision loss of left eye    2. Papilledema of left eye      Readmission: no  Isolation Requirements:  no  Recommended Level of Care:  med/surg  Code Status:  Full Code  Department:[facility] Adult ED - [phone]           Recent Results (from the past 24 hour(s))   METABOLIC PANEL, COMPREHENSIVE    Collection Time: 01/30/20  2:20 PM   Result Value Ref Range    Sodium 136 136 - 145 mmol/L    Potassium 4.1 3.5 - 5.1 mmol/L    Chloride 105 97 - 108 mmol/L    CO2 25 21 - 32 mmol/L    Anion gap 6 5 - 15 mmol/L    Glucose 81 65 - 100 mg/dL    BUN 8 6 - 20 MG/DL    Creatinine 0.75 0.55 - 1.02 MG/DL    BUN/Creatinine ratio 11 (L) 12 - 20      GFR est AA >60 >60 ml/min/1.73m2    GFR est non-AA >60 >60 ml/min/1.73m2    Calcium 8.6 8.5 - 10.1 MG/DL    Bilirubin, total 0.4 0.2 - 1.0 MG/DL    ALT (SGPT) 35 12 - 78 U/L    AST (SGOT) 34 15 - 37 U/L    Alk.  phosphatase 84 45 - 117 U/L    Protein, total 7.5 6.4 - 8.2 g/dL    Albumin 3.4 (L) 3.5 - 5.0 g/dL    Globulin 4.1 (H) 2.0 - 4.0 g/dL    A-G Ratio 0.8 (L) 1.1 - 2.2     SAMPLES BEING HELD    Collection Time: 01/30/20  2:20 PM   Result Value Ref Range    SAMPLES BEING HELD  1 RED, 1 BLUE     COMMENT        Add-on orders for these samples will be processed based on acceptable specimen integrity and analyte stability, which may vary by analyte. CBC WITH AUTOMATED DIFF    Collection Time: 01/30/20  2:57 PM   Result Value Ref Range    WBC 6.8 3.6 - 11.0 K/uL    RBC 4.29 3.80 - 5.20 M/uL    HGB 12.9 11.5 - 16.0 g/dL    HCT 39.2 35.0 - 47.0 %    MCV 91.4 80.0 - 99.0 FL    MCH 30.1 26.0 - 34.0 PG    MCHC 32.9 30.0 - 36.5 g/dL    RDW 12.4 11.5 - 14.5 %    PLATELET 193 904 - 736 K/uL    MPV 10.7 8.9 - 12.9 FL    NRBC 0.0 0  WBC    ABSOLUTE NRBC 0.00 0.00 - 0.01 K/uL    NEUTROPHILS 57 32 - 75 %    LYMPHOCYTES 32 12 - 49 %    MONOCYTES 8 5 - 13 %    EOSINOPHILS 2 0 - 7 %    BASOPHILS 1 0 - 1 %    IMMATURE GRANULOCYTES 0 0.0 - 0.5 %    ABS. NEUTROPHILS 3.9 1.8 - 8.0 K/UL    ABS. LYMPHOCYTES 2.2 0.8 - 3.5 K/UL    ABS. MONOCYTES 0.5 0.0 - 1.0 K/UL    ABS. EOSINOPHILS 0.1 0.0 - 0.4 K/UL    ABS. BASOPHILS 0.1 0.0 - 0.1 K/UL    ABS. IMM. GRANS. 0.0 0.00 - 0.04 K/UL    DF SMEAR SCANNED      RBC COMMENTS NORMOCYTIC, NORMOCHROMIC     HEMOGLOBIN A1C WITH EAG    Collection Time: 01/30/20  2:57 PM   Result Value Ref Range    Hemoglobin A1c 5.0 4.0 - 5.6 %    Est. average glucose 97 mg/dL   SED RATE (ESR)    Collection Time: 01/30/20  2:57 PM   Result Value Ref Range    Sed rate, automated 2 0 - 20 mm/hr       Mri Brain W Wo Cont    Result Date: 1/30/2020  *PRELIMINARY REPORT* MRI examination of the brain demonstrates atrophy of the right optic nerve but no evidence of nerve enhancement bilaterally or other acute finding. Preliminary report was provided by Dr. Olivia Olivares, the on-call radiologist, at 6:44 PM Final report to follow.  *END PRELIMINARY REPORT*

## 2020-01-30 NOTE — ED TRIAGE NOTES
Referred here by eye doctor for STAT MRI. Scheduled for MRI outpatient in February but [facility] told pt to not wait that long and go to ER>    Pt c/o eye pressure/pain behind LEFT eye. Denies fever. Denies nausea or numbness/tingling.

## 2020-01-31 ENCOUNTER — APPOINTMENT (OUTPATIENT)
Dept: MRI IMAGING | Age: 35
DRG: 123 | End: 2020-01-31
Attending: INTERNAL MEDICINE
Payer: COMMERCIAL

## 2020-01-31 LAB
25(OH)D3 SERPL-MCNC: 10 NG/ML (ref 30–100)
ALBUMIN SERPL-MCNC: 3.1 G/DL (ref 3.5–5)
ALBUMIN/GLOB SERPL: 0.7 {RATIO} (ref 1.1–2.2)
ALP SERPL-CCNC: 102 U/L (ref 45–117)
ALT SERPL-CCNC: 38 U/L (ref 12–78)
ANION GAP SERPL CALC-SCNC: 7 MMOL/L (ref 5–15)
AST SERPL-CCNC: 28 U/L (ref 15–37)
BILIRUB SERPL-MCNC: 0.4 MG/DL (ref 0.2–1)
BUN SERPL-MCNC: 8 MG/DL (ref 6–20)
BUN/CREAT SERPL: 9 (ref 12–20)
CALCIUM SERPL-MCNC: 8.5 MG/DL (ref 8.5–10.1)
CHLORIDE SERPL-SCNC: 108 MMOL/L (ref 97–108)
CHOLEST SERPL-MCNC: 170 MG/DL
CO2 SERPL-SCNC: 21 MMOL/L (ref 21–32)
COMMENT, HOLDF: NORMAL
CREAT SERPL-MCNC: 0.89 MG/DL (ref 0.55–1.02)
CRP SERPL HS-MCNC: 4.9 MG/L
ERYTHROCYTE [DISTWIDTH] IN BLOOD BY AUTOMATED COUNT: 12.4 % (ref 11.5–14.5)
GLOBULIN SER CALC-MCNC: 4.3 G/DL (ref 2–4)
GLUCOSE BLD STRIP.AUTO-MCNC: 126 MG/DL (ref 65–100)
GLUCOSE BLD STRIP.AUTO-MCNC: 129 MG/DL (ref 65–100)
GLUCOSE BLD STRIP.AUTO-MCNC: 156 MG/DL (ref 65–100)
GLUCOSE BLD STRIP.AUTO-MCNC: 168 MG/DL (ref 65–100)
GLUCOSE SERPL-MCNC: 144 MG/DL (ref 65–100)
HCT VFR BLD AUTO: 37.9 % (ref 35–47)
HDLC SERPL-MCNC: 58 MG/DL
HDLC SERPL: 2.9 {RATIO} (ref 0–5)
HGB BLD-MCNC: 12.7 G/DL (ref 11.5–16)
LDLC SERPL CALC-MCNC: 84.8 MG/DL (ref 0–100)
LIPID PROFILE,FLP: NORMAL
MCH RBC QN AUTO: 30.5 PG (ref 26–34)
MCHC RBC AUTO-ENTMCNC: 33.5 G/DL (ref 30–36.5)
MCV RBC AUTO: 90.9 FL (ref 80–99)
NRBC # BLD: 0 K/UL (ref 0–0.01)
NRBC BLD-RTO: 0 PER 100 WBC
PLATELET # BLD AUTO: 282 K/UL (ref 150–400)
PMV BLD AUTO: 10.4 FL (ref 8.9–12.9)
POTASSIUM SERPL-SCNC: 4.1 MMOL/L (ref 3.5–5.1)
PROT SERPL-MCNC: 7.4 G/DL (ref 6.4–8.2)
RBC # BLD AUTO: 4.17 M/UL (ref 3.8–5.2)
SAMPLES BEING HELD,HOLD: NORMAL
SERVICE CMNT-IMP: ABNORMAL
SODIUM SERPL-SCNC: 136 MMOL/L (ref 136–145)
TRIGL SERPL-MCNC: 136 MG/DL (ref ?–150)
VIT B12 SERPL-MCNC: 270 PG/ML (ref 193–986)
VLDLC SERPL CALC-MCNC: 27.2 MG/DL
WBC # BLD AUTO: 5.3 K/UL (ref 3.6–11)

## 2020-01-31 PROCEDURE — 74011250636 HC RX REV CODE- 250/636: Performed by: INTERNAL MEDICINE

## 2020-01-31 PROCEDURE — 74011636637 HC RX REV CODE- 636/637: Performed by: INTERNAL MEDICINE

## 2020-01-31 PROCEDURE — 82607 VITAMIN B-12: CPT

## 2020-01-31 PROCEDURE — 72157 MRI CHEST SPINE W/O & W/DYE: CPT

## 2020-01-31 PROCEDURE — 65660000000 HC RM CCU STEPDOWN

## 2020-01-31 PROCEDURE — 86141 C-REACTIVE PROTEIN HS: CPT

## 2020-01-31 PROCEDURE — 82962 GLUCOSE BLOOD TEST: CPT

## 2020-01-31 PROCEDURE — 74011250637 HC RX REV CODE- 250/637: Performed by: HOSPITALIST

## 2020-01-31 PROCEDURE — 36415 COLL VENOUS BLD VENIPUNCTURE: CPT

## 2020-01-31 PROCEDURE — 86038 ANTINUCLEAR ANTIBODIES: CPT

## 2020-01-31 PROCEDURE — 80053 COMPREHEN METABOLIC PANEL: CPT

## 2020-01-31 PROCEDURE — 74011000258 HC RX REV CODE- 258: Performed by: INTERNAL MEDICINE

## 2020-01-31 PROCEDURE — 80061 LIPID PANEL: CPT

## 2020-01-31 PROCEDURE — 94760 N-INVAS EAR/PLS OXIMETRY 1: CPT

## 2020-01-31 PROCEDURE — 74011250636 HC RX REV CODE- 250/636: Performed by: HOSPITALIST

## 2020-01-31 PROCEDURE — 74011250637 HC RX REV CODE- 250/637: Performed by: INTERNAL MEDICINE

## 2020-01-31 PROCEDURE — 82306 VITAMIN D 25 HYDROXY: CPT

## 2020-01-31 PROCEDURE — A9575 INJ GADOTERATE MEGLUMI 0.1ML: HCPCS | Performed by: HOSPITALIST

## 2020-01-31 PROCEDURE — 85027 COMPLETE CBC AUTOMATED: CPT

## 2020-01-31 RX ORDER — SODIUM CHLORIDE 0.9 % (FLUSH) 0.9 %
10 SYRINGE (ML) INJECTION
Status: COMPLETED | OUTPATIENT
Start: 2020-01-31 | End: 2020-01-31

## 2020-01-31 RX ORDER — GADOTERATE MEGLUMINE 376.9 MG/ML
20 INJECTION INTRAVENOUS
Status: COMPLETED | OUTPATIENT
Start: 2020-01-31 | End: 2020-01-31

## 2020-01-31 RX ORDER — ALPRAZOLAM 0.25 MG/1
0.25 TABLET ORAL
Status: DISCONTINUED | OUTPATIENT
Start: 2020-01-31 | End: 2020-02-01 | Stop reason: HOSPADM

## 2020-01-31 RX ORDER — ESCITALOPRAM OXALATE 10 MG/1
20 TABLET ORAL DAILY
Status: DISCONTINUED | OUTPATIENT
Start: 2020-01-31 | End: 2020-02-01 | Stop reason: HOSPADM

## 2020-01-31 RX ADMIN — SODIUM CHLORIDE 100 ML/HR: 900 INJECTION, SOLUTION INTRAVENOUS at 20:26

## 2020-01-31 RX ADMIN — ALPRAZOLAM 0.25 MG: 0.25 TABLET ORAL at 18:38

## 2020-01-31 RX ADMIN — Medication 10 ML: at 05:57

## 2020-01-31 RX ADMIN — Medication 10 ML: at 13:29

## 2020-01-31 RX ADMIN — INSULIN LISPRO 2 UNITS: 100 INJECTION, SOLUTION INTRAVENOUS; SUBCUTANEOUS at 12:02

## 2020-01-31 RX ADMIN — Medication 10 ML: at 17:48

## 2020-01-31 RX ADMIN — SODIUM CHLORIDE 1000 MG: 900 INJECTION, SOLUTION INTRAVENOUS at 20:27

## 2020-01-31 RX ADMIN — FAMOTIDINE 20 MG: 20 TABLET ORAL at 08:43

## 2020-01-31 RX ADMIN — ESCITALOPRAM OXALATE 20 MG: 10 TABLET ORAL at 08:43

## 2020-01-31 RX ADMIN — Medication 10 ML: at 21:32

## 2020-01-31 RX ADMIN — GADOTERATE MEGLUMINE 20 ML: 376.9 INJECTION INTRAVENOUS at 13:28

## 2020-01-31 RX ADMIN — FAMOTIDINE 20 MG: 20 TABLET ORAL at 17:47

## 2020-01-31 NOTE — H&P
[address]   HISTORY AND PHYSICAL    Name:  Pamela Washington  MR#:  [id number]  :  1985  ACCOUNT #:  [de-identified]  ADMIT DATE:  2020    PRIMARY CARE PHYSICIAN:  Unknown. CHIEF COMPLAINT:  Left eye vision changes. HISTORY OF PRESENT ILLNESS:  A 70-year-old female with a past medical history significant for transverse myelitis with residual right optic nerve atrophy, obesity, tinnitus, migraine headaches and anxiety disorder, was referred to the [facility] Emergency Room by ophthalmologist, Dr. Karla Weiner, for further evaluation of an approximately 2-week history of left eye vision changes and Papilledema  The patient denied any associated headaches, dizziness, chest pains, shortness of breath, palpitations, nausea, vomiting, fevers, chills, bladder or bowel irregularities. PAST MEDICAL HISTORY:  1. Transverse myelitis with residual right optic nerve atrophy  2. Tinnitus. 3.  Obesity. 4.  Migraine headaches. 5.  Anxiety disorder. PAST SURGICAL HISTORY:  Nil of note. HOME MEDICATIONS:  1. Zyrtec 10 mg p.o. daily. 2.  Lexapro 10 mg p.o. daily. 3.  Norgestimate-ethinyl estradiol 0.25-35 mg/mcg 1 tablet p.o. daily    ALLERGIES:  NO KNOWN DRUG ALLERGIES. SOCIAL HISTORY:  Significant for living at home. Prior tobacco use. Denies any alcohol use disorder, is independent of activities and instrumental activities of daily living. Ambulates unassisted. FAMILY HISTORY:  Reviewed and negative for any premature coronary artery disease or death. Negative for diabetes mellitus. Negative for malignancy. REVIEW OF SYSTEMS:  A complete system review conducted essentially negative, otherwise as outlined in the history of the presenting illness. PHYSICAL EXAMINATION:  GENERAL:  The patient was awake, alert, not in any overt distress. VITAL SIGNS:  Blood pressure of 143/76, heart rate 83, respiratory rate 20, afebrile, saturating 98% on room air.   HEAD EXAM: Normocephalic. Pupils equal and reactive to light without any nystagmus. ENT EXAM:  Ear, nose, throat clear. NECK EXAM:  No jugular venous distention or carotid bruits. CARDIOVASCULAR EXAM S1, S2 present without any murmurs. RESPIRATORY EXAM:  Lungs with decreased air entry at both bases without any crepitations or rhonchi. GI EXAM:  Abdomen obese. Bowel sounds present. The abdomen is soft, nontender. No rebound, no guarding. GENITOURINARY EXAM:  No suprapubic or flank tenderness. MUSCULOSKELETAL EXAM:  No asymmetry of the extremities. Power appeared to be 5/5 in bilateral upper and lower extremities. LABS/RADIOGRAPHIC FINDINGS:  As follows: An MRI of the brain with findings of atrophy of the right optic nerve but no evidence of nerve enhancement bilaterally or other acute findings. Metabolic panel with a sodium of 136, potassium 4.1, chloride 105, bicarbonate 25, BUN of 8, creatinine 0.75, glucose of 81, calcium 8.6 with an albumin of 3.4, A1c of 5. Quantitative serum beta hCG negative. CBC with a WBC of 6.8, hemoglobin 12.9, hematocrit 39.2 with a platelet count of 313. Sedimentation rate of 2. ASSESSMENT AND PLAN:  1. Central nervous system:  The patient will be admitted to the inpatient level for subacute left eye visual deficits with papilledema. Rule out optic neuritis. History of transverse myelitis with residual right optic nerve atrophy. MRI of the brain without any acute findings. For further evaluation with MRI of the thoracic spine. Solu-Medrol 1 g IV daily and close neurologic checks. Case was discussed with the on-call neurologist, Dr. Louis Damon, by the emergency room physician. 2.  Endocrine:  Accu-Chek monitoring whilst on high-dose steroids. Morbid obesity with a body mass index greater than 40. Therapeutic lifestyle changes counseling done. 3.  Ear, nose and throat:  History of tinnitus and presbycusis.   4.  Psychiatry:  Anxiety disorder without any current behavioral disturbances. 5.  Prophylaxis for deep venous thrombosis. 6.  Directives:  Full code. In summary, a 76-year-old female with a past medical history significant for transverse myelitis with residual right optic nerve atrophy, morbid obesity, migraine headaches, tinnitus, hearing loss and anxiety disorder, is being admitted to the inpatient level for left eye vision changes with papilledema with a low threshold for probable optic neuritis. The patient is at increased risk of further decompensation and will benefit from further evaluation including with markers of inflammation, high-dose IV steroids, close neurologic checks and Neurology consult. The entire admission plans discussed in detail with the patient. All questions and concerns were addressed. The patient's functional status prior to admission significant for the patient being able to ambulate unassisted.     Total time for admission 40 minutes of which at least 50% of the time was spent in plan of care and counseling of the patient      Laurence Cross MD      [id number]  D:  01/31/2020 3:53  T:  01/31/2020 5:34  JOB #:  [id number]

## 2020-01-31 NOTE — PROGRESS NOTES
Bedside and Verbal shift change report given to 1541 Josse Mcgowan (oncoming nurse) by Cathalene Blizzard RN (offgoing nurse). Report included the following information SBAR, Kardex, ED Summary, Intake/Output, MAR, Cardiac Rhythm NSR and Dual Neuro Assessment. 2150: TRANSFER - IN REPORT:    Verbal report received from [address] [place] [address] RN(name) on Yadi Adler  being received from ED(unit) for routine progression of care      Report consisted of patients Situation, Background, Assessment and   Recommendations(SBAR). Information from the following report(s) SBAR, Kardex, ED Summary, Intake/Output, MAR and Cardiac Rhythm NSR was reviewed with the receiving nurse. Opportunity for questions and clarification was provided. Assessment completed upon patients arrival to unit and care assumed. Problem: Falls - Risk of  Goal: *Absence of Falls  Description  Document Noa Armas Fall Risk and appropriate interventions in the flowsheet. Outcome: Progressing Towards Goal  Note: Fall Risk Interventions:            Medication Interventions: Teach patient to arise slowly, Patient to call before getting OOB, Evaluate medications/consider consulting pharmacy                   Problem: Pain  Goal: *Control of Pain  Outcome: Progressing Towards Goal     Problem: Pressure Injury - Risk of  Goal: *Prevention of pressure injury  Description  Document Edi Scale and appropriate interventions in the flowsheet. Outcome: Progressing Towards Goal  Note: Pressure Injury Interventions:             Activity Interventions: Assess need for specialty bed, Increase time out of bed, PT/OT evaluation         Nutrition Interventions: Document food/fluid/supplement intake                     Problem: General Medical Care Plan  Goal: *Vital signs within specified parameters  Outcome: Progressing Towards Goal  Goal: *Labs within defined limits  Outcome: Progressing Towards Goal  Goal: *Absence of infection signs and symptoms  Outcome: Progressing Towards Goal

## 2020-01-31 NOTE — PROGRESS NOTES
CM attempted to meet with patient but she was sleeping. . Patient has hx of transverse myelitis with residual right optic nerve atrophy, obesity, migraine headaches and tinnitus. Patient came to Er from ophthalmologist office. .for 2 week history of left eye changes. Chart indicates patient lives at home and was independent in daily living prior to admission and ambulates with no assistance. Cm will try to meet with patient another time to discuss transition of care planning. Patient  has [employer] PPO .   No emergency contact listed

## 2020-01-31 NOTE — ED NOTES
MRI called and stated they cannot do the MRI spine tonight because they had a brain MRI today. Dr. Eda Fatima paged to notify. Fred returned page, notified MD that they will do MRI tomorrow afternoon, MD verbalized understanding, no new orders received at this time.

## 2020-01-31 NOTE — PROGRESS NOTES
[facility] Adult  Hospitalist Group                                                                                          Hospitalist Progress Note  Eula Murphy MD  Answering service: [phone] OR [phone] from in house phone        Date of Service:  2020  NAME:  Vivian William  :  1985  MRN:  [id number]      Admission Summary:   A 53-year-old female with a past medical history significant for transverse myelitis with residual right optic nerve atrophy, obesity, tinnitus, migraine headaches and anxiety disorder, was referred to the [address] Emergency Room by ophthalmologist, Dr. Karen Evans, for further evaluation of an approximately 2-week history of left eye vision changes and Papilledema  The patient denied any associated headaches, dizziness, chest pains, shortness of breath, palpitations, nausea, vomiting, fevers, chills, bladder or bowel irregularities. Interval history / Subjective:     F/u left eye vision changes     Assessment & Plan:     Left eye vision changes  -sec to ?  -history of transverse myelitis  -MRI brain  Abnormal enhancement and signal left optic nerve consistent with clinical  suspicion of left optic neuritis. Multiple foci T2 hyperintensity cerebral white matter and pattern consistent with clinical history history of demyelinating disease. No associated abnormal enhancement or other findings of active disease in the brain.  -Awaiting MRI thoracic spine  -Solumedrol  -Awaiting Neuro    Obesity  -counseled    Anxiety disorder  -stable    Regular diet    Code status: FULL CODE  DVT prophylaxis: scd    Plan: Follow Neuro    Care Plan discussed with: Patient/Family  Disposition: TBD     Hospital Problems  Date Reviewed: 7/10/2017          Codes Class Noted POA    Papilledema of left eye ICD-10-CM: H47.10  ICD-9-CM: 377.00  2020 Unknown                Review of Systems:   A comprehensive review of systems was negative except for that written in the HPI. Vital Signs:    Last 24hrs VS reviewed since prior progress note. Most recent are:  Visit Vitals  BP (!) 168/97 (BP 1 Location: Left arm, BP Patient Position: Sitting)   Pulse 81   Temp 97.9 °F (36.6 °C)   Resp 11   Wt 115.4 kg (254 lb 8 oz)   SpO2 99%   BMI 43.68 kg/m²       No intake or output data in the 24 hours ending 01/31/20 1103     Physical Examination:             Constitutional:  No acute distress, cooperative, pleasant    ENT:  Oral mucosa moist, oropharynx benign. Resp:  CTA bilaterally. No wheezing/rhonchi/rales. No accessory muscle use   CV:  Regular rhythm, normal rate, no murmurs, gallops, rubs    GI:  Soft, non distended, non tender. normoactive bowel sounds, no hepatosplenomegaly     Musculoskeletal:  No edema, warm, 2+ pulses throughout    Neurologic:  Moves all extremities. AAOx3, CN II-XII reviewed     Skin:  Good turgor, no rashes or ulcers       Data Review:    Review and/or order of clinical lab test      Labs:     Recent Labs     01/31/20  0520 01/30/20  1457   WBC 5.3 6.8   HGB 12.7 12.9   HCT 37.9 39.2    186     Recent Labs     01/31/20  0520 01/30/20  1420    136   K 4.1 4.1    105   CO2 21 25   BUN 8 8   CREA 0.89 0.75   * 81   CA 8.5 8.6     Recent Labs     01/31/20  0520 01/30/20  1420   SGOT 28 34   ALT 38 35    84   TBILI 0.4 0.4   TP 7.4 7.5   ALB 3.1* 3.4*   GLOB 4.3* 4.1*     No results for input(s): INR, PTP, APTT, INREXT in the last 72 hours. No results for input(s): FE, TIBC, PSAT, FERR in the last 72 hours. Lab Results   Component Value Date/Time    Folate >20.0 04/24/2017 03:03 PM      No results for input(s): PH, PCO2, PO2 in the last 72 hours. No results for input(s): CPK, CKNDX, TROIQ in the last 72 hours.     No lab exists for component: CPKMB  Lab Results   Component Value Date/Time    Cholesterol, total 170 01/31/2020 05:20 AM    HDL Cholesterol 58 01/31/2020 05:20 AM    LDL, calculated 84.8 01/31/2020 05:20 AM Triglyceride 136 01/31/2020 05:20 AM    CHOL/HDL Ratio 2.9 01/31/2020 05:20 AM     Lab Results   Component Value Date/Time    Glucose (POC) 168 (H) 01/31/2020 06:59 AM    Glucose (POC) 100 01/30/2020 10:27 PM     Lab Results   Component Value Date/Time    Color Yellow 04/24/2017 03:08 PM    Appearance Clear 04/24/2017 03:08 PM    pH (UA) 6.5 04/24/2017 03:08 PM    Ketone Negative 04/24/2017 03:08 PM    Bilirubin Negative 04/24/2017 03:08 PM    Nitrites Negative 04/24/2017 03:08 PM    Leukocyte Esterase Negative 04/24/2017 03:08 PM    Bacteria Few 04/24/2017 03:08 PM    WBC None seen 04/24/2017 03:08 PM    RBC 0-2 04/24/2017 03:08 PM         Medications Reviewed:     Current Facility-Administered Medications   Medication Dose Route Frequency    escitalopram oxalate (LEXAPRO) tablet 20 mg  20 mg Oral DAILY    . PHARMACY TO SUBSTITUTE PER PROTOCOL (Reordered from: norgestimate-ethinyl estradioL (SPRINTEC, 28,) 0.25-35 mg-mcg tab)    Per Protocol    sodium chloride (NS) flush 5-40 mL  5-40 mL IntraVENous Q8H    sodium chloride (NS) flush 5-40 mL  5-40 mL IntraVENous PRN    0.9% sodium chloride infusion  100 mL/hr IntraVENous CONTINUOUS    acetaminophen (TYLENOL) tablet 650 mg  650 mg Oral Q4H PRN    ondansetron (ZOFRAN) injection 4 mg  4 mg IntraVENous Q6H PRN    methylPREDNISolone ((Solu-MEDROL) 1,000 mg in 0.9% sodium chloride (MBP/ADV) 100 mL  1,000 mg IntraVENous Q24H    famotidine (PEPCID) tablet 20 mg  20 mg Oral BID    glucose chewable tablet 16 g  4 Tab Oral PRN    glucagon (GLUCAGEN) injection 1 mg  1 mg IntraMUSCular PRN    insulin lispro (HUMALOG) injection   SubCUTAneous AC&HS    dextrose 10 % infusion 125-250 mL  125-250 mL IntraVENous PRN     ______________________________________________________________________  EXPECTED LENGTH OF STAY: - - -  ACTUAL LENGTH OF STAY:          1                 Betsey Fisher MD

## 2020-01-31 NOTE — PROGRESS NOTES
Radiology   MRI Orbit final:  IMPRESSION:   1. Abnormal enhancement and signal left optic nerve consistent with clinical suspicion of left optic neuritis. 2. Multiple foci T2 hyperintensity cerebral white matter and pattern consistent with clinical history history of demyelinating disease. No associated abnormal enhancement or other findings of active disease in the brain.

## 2020-01-31 NOTE — H&P
[address] [place] [address] HISTORY AND PHYSICAL Name:  Cheryl Henley 
MR#:  [id number] :  1985 ACCOUNT #:  [de-identified] ADMIT DATE:  2020

## 2020-01-31 NOTE — PROGRESS NOTES
Admission Medication Reconciliation:    Information obtained from:  Rx query  RxQuery data available¹:  YES    Comments/Recommendations: Updated PTA meds/reviewed patient's allergies. There are currently no changes to the patient's medications at this time      [facility] benefit data reflects medications filled and processed through the patient's insurance, however   this data does NOT capture whether the medication was picked up or is currently being taken by the patient. Allergies:  Patient has no known allergies. Significant PMH/Disease States:   Past Medical History:   Diagnosis Date    Acute transverse myelitis (HCC)     Anxiety     Fatigue     Hearing loss     Incontinence     Joint pain     Migraine     Muscle pain     Muscle weakness     Ringing in the ears      Chief Complaint for this Admission:    Chief Complaint   Patient presents with    Referral / Consult     Prior to Admission Medications:   Prior to Admission Medications   Prescriptions Last Dose Informant Taking? cetirizine (ZYRTEC) 10 mg tablet   Yes   Sig: Take 10 mg by mouth daily. escitalopram oxalate (LEXAPRO) 10 mg tablet   Yes   Sig: Take 20 mg by mouth daily. norgestimate-ethinyl estradioL ([facility], ,) 0.25-35 mg-mcg tab   Yes   Sig: Take 1 Tab by mouth daily. Facility-Administered Medications: None       Please contact the main inpatient pharmacy with any questions or concerns at [phone] and we will direct you to the clinical pharmacist covering this patient's care while in-house.    ALBA MoserD

## 2020-01-31 NOTE — ROUTINE PROCESS
TRANSFER - OUT REPORT: 
 
Verbal report given to ROHIT Sanford(name) on Alexandre Upton  being transferred to [facility](unit) for routine progression of care Report consisted of patients Situation, Background, Assessment and  
Recommendations(SBAR). Information from the following report(s) SBAR, ED Summary, Intake/Output, MAR and Recent Results was reviewed with the receiving nurse. Lines:  
Peripheral IV 01/30/20 Left Antecubital (Active) Site Assessment Clean, dry, & intact 1/30/2020  3:02 PM  
Phlebitis Assessment 0 1/30/2020  3:02 PM  
Infiltration Assessment 0 1/30/2020  3:02 PM  
Dressing Status Clean, dry, & intact 1/30/2020  3:02 PM  
  
 
Opportunity for questions and clarification was provided. Patient transported with: 
Transport.

## 2020-02-01 VITALS
BODY MASS INDEX: 43.84 KG/M2 | SYSTOLIC BLOOD PRESSURE: 131 MMHG | WEIGHT: 255.4 LBS | TEMPERATURE: 98.3 F | HEART RATE: 81 BPM | OXYGEN SATURATION: 99 % | RESPIRATION RATE: 20 BRPM | DIASTOLIC BLOOD PRESSURE: 94 MMHG

## 2020-02-01 LAB
ACE SERPL-CCNC: 28 U/L (ref 14–82)
ANA SER QL: NEGATIVE
GLUCOSE BLD STRIP.AUTO-MCNC: 110 MG/DL (ref 65–100)
GLUCOSE BLD STRIP.AUTO-MCNC: 119 MG/DL (ref 65–100)
GLUCOSE BLD STRIP.AUTO-MCNC: 141 MG/DL (ref 65–100)
SERVICE CMNT-IMP: ABNORMAL

## 2020-02-01 PROCEDURE — 86256 FLUORESCENT ANTIBODY TITER: CPT

## 2020-02-01 PROCEDURE — 74011636637 HC RX REV CODE- 636/637: Performed by: INTERNAL MEDICINE

## 2020-02-01 PROCEDURE — 83520 IMMUNOASSAY QUANT NOS NONAB: CPT

## 2020-02-01 PROCEDURE — 74011250637 HC RX REV CODE- 250/637: Performed by: INTERNAL MEDICINE

## 2020-02-01 PROCEDURE — 82962 GLUCOSE BLOOD TEST: CPT

## 2020-02-01 PROCEDURE — 74011250637 HC RX REV CODE- 250/637: Performed by: PSYCHIATRY & NEUROLOGY

## 2020-02-01 PROCEDURE — 74011250636 HC RX REV CODE- 250/636: Performed by: INTERNAL MEDICINE

## 2020-02-01 PROCEDURE — 36415 COLL VENOUS BLD VENIPUNCTURE: CPT

## 2020-02-01 PROCEDURE — 74011000258 HC RX REV CODE- 258: Performed by: INTERNAL MEDICINE

## 2020-02-01 RX ORDER — LANOLIN ALCOHOL/MO/W.PET/CERES
1000 CREAM (GRAM) TOPICAL DAILY
Qty: 30 TAB | Refills: 0 | Status: SHIPPED | OUTPATIENT
Start: 2020-02-01 | End: 2020-03-02

## 2020-02-01 RX ORDER — ERGOCALCIFEROL 1.25 MG/1
50000 CAPSULE ORAL
Qty: 4 CAP | Refills: 0 | Status: SHIPPED | OUTPATIENT
Start: 2020-02-01 | End: 2020-03-02

## 2020-02-01 RX ORDER — LANOLIN ALCOHOL/MO/W.PET/CERES
1000 CREAM (GRAM) TOPICAL DAILY
Status: DISCONTINUED | OUTPATIENT
Start: 2020-02-01 | End: 2020-02-01 | Stop reason: HOSPADM

## 2020-02-01 RX ORDER — PREDNISONE 10 MG/1
10 TABLET ORAL SEE ADMIN INSTRUCTIONS
Status: DISCONTINUED | OUTPATIENT
Start: 2020-02-02 | End: 2020-02-01 | Stop reason: HOSPADM

## 2020-02-01 RX ORDER — PREDNISONE 10 MG/1
10 TABLET ORAL SEE ADMIN INSTRUCTIONS
Qty: 27 TAB | Refills: 0 | Status: SHIPPED | OUTPATIENT
Start: 2020-02-02 | End: 2021-12-09

## 2020-02-01 RX ORDER — FAMOTIDINE 20 MG/1
20 TABLET, FILM COATED ORAL 2 TIMES DAILY
Qty: 60 TAB | Refills: 0 | Status: SHIPPED | OUTPATIENT
Start: 2020-02-01 | End: 2020-03-02

## 2020-02-01 RX ORDER — ERGOCALCIFEROL 1.25 MG/1
50000 CAPSULE ORAL
Status: DISCONTINUED | OUTPATIENT
Start: 2020-02-01 | End: 2020-02-01 | Stop reason: HOSPADM

## 2020-02-01 RX ADMIN — Medication 10 ML: at 13:09

## 2020-02-01 RX ADMIN — SODIUM CHLORIDE 100 ML/HR: 900 INJECTION, SOLUTION INTRAVENOUS at 07:10

## 2020-02-01 RX ADMIN — CYANOCOBALAMIN TAB 500 MCG 1000 MCG: 500 TAB at 13:24

## 2020-02-01 RX ADMIN — Medication 10 ML: at 05:34

## 2020-02-01 RX ADMIN — FAMOTIDINE 20 MG: 20 TABLET ORAL at 08:19

## 2020-02-01 RX ADMIN — INSULIN LISPRO 2 UNITS: 100 INJECTION, SOLUTION INTRAVENOUS; SUBCUTANEOUS at 08:19

## 2020-02-01 RX ADMIN — ESCITALOPRAM OXALATE 20 MG: 10 TABLET ORAL at 08:19

## 2020-02-01 RX ADMIN — ERGOCALCIFEROL 50000 UNITS: 1.25 CAPSULE ORAL at 13:24

## 2020-02-01 RX ADMIN — SODIUM CHLORIDE 1000 MG: 900 INJECTION, SOLUTION INTRAVENOUS at 15:10

## 2020-02-01 NOTE — DISCHARGE INSTRUCTIONS
Discharge Instructions       PATIENT ID: Lala Fregoso  MRN: [id number]   YOB: 1985    DATE OF ADMISSION: 1/30/2020  2:19 PM    DATE OF DISCHARGE: 2/1/2020    PRIMARY CARE PROVIDER: Gertrude Severin, MD     ATTENDING PHYSICIAN: Christian Valdez MD  DISCHARGING PROVIDER: Mague Davis MD    To contact this individual call [phone] and ask the  to page. If unavailable ask to be transferred the Adult Hospitalist Department. DISCHARGE DIAGNOSES Optic neuritis    CONSULTATIONS: IP CONSULT TO NEUROLOGY  IP CONSULT TO NEUROLOGY  IP CONSULT TO HOSPITALIST    PROCEDURES/SURGERIES: * No surgery found *    FOLLOW UP APPOINTMENTS:   Follow-up Information     Follow up With Specialties Details Why Contact Info      In 1 week  Neurology           ADDITIONAL CARE RECOMMENDATIONS: follow up with PCP and Neurology    DIET: Resume previous diet    DISCHARGE MEDICATIONS:  Taper steroids. vitamins    · It is important that you take the medication exactly as they are prescribed. · Keep your medication in the bottles provided by the pharmacist and keep a list of the medication names, dosages, and times to be taken in your wallet. · Do not take other medications without consulting your doctor. NOTIFY YOUR PHYSICIAN FOR ANY OF THE FOLLOWING:   Fever over 101 degrees for 24 hours. Chest pain, shortness of breath, fever, chills, nausea, vomiting, diarrhea, change in mentation, falling, weakness, bleeding. Severe pain or pain not relieved by medications. Or, any other signs or symptoms that you may have questions about. It was our pleasure to help take care of you and we hope you get well very soon.     DISPOSITION:    Home With:   OT  PT  HH  RN       SNF/Inpatient Rehab/LTAC   x Independent/assisted living    Hospice    Other:     CDMP Checked:   Yes x     PROBLEM LIST Updated:  Yes x     Signed:   Mague Davis MD  2/1/2020  1:24 PM

## 2020-02-01 NOTE — PROGRESS NOTES
Neurology Progress Note    Patient ID:  Emmy Enriquez  [id number]  27 y.o.  1985    Emmy Enriquez is a 27 y.o. female who is being seen for left eye vision change. She has a past medical history significant of transverse myelitis with residual right optic nerve atrophy. She reports about 13 years ago she had developed paralysis upper and lower extremities. Around that time she denied any visual disturbance. They had was discharge her with the diagnosis  of transverse myelitis. About 5 years ago she then developed right side visual disturbance. She reports that she could not see out of the right side of her right eye. Majority of her care has been done by her primary care provider, because she was aware there was no treatment for her disease. She did However see a neurologist about 2 years ago, due to burning sensation in her upper left leg and she had developed some urinary incontinence. Her previous neurologist  had ordered several lab and MRI of the brain and cervical spine, and emg however she did not follow through with the complete plan. EMG 2017, Normal study.      As for this admission she had developed new onset left-sided visual disturbance 2 weeks ago. She went to go see her ophthalmologist Dr. Juanjose Upton who stated that she had papilledema and recommended that she go to the emergency room. MRI of the brain revealed abnormal enhancement and signal left optic nerve consistent with clinical suspicion of left optic neuritis. Multiple foci T2 hyperintensity cerebral white matter . No associated abnormal enhancement or other findings of active disease in the brain. She was started on Solu-Medrol infusion. She has had a total of 2 infusions so far,and has seen improvement with her vision. Subjective:   Vision has improved she is back to baseline. No event overnight. MRI of T-spine,  no abnormal cord signal or abnormal enhancement. Labwork., pending. Objective:    All records in [facility] reviewed and noted    ROS:  Per HPI  All other 12 pt ROS negative    Meds:  Current Facility-Administered Medications   Medication Dose Route Frequency    escitalopram oxalate (LEXAPRO) tablet 20 mg  20 mg Oral DAILY    ALPRAZolam (XANAX) tablet 0.25 mg  0.25 mg Oral BID PRN    sodium chloride (NS) flush 5-40 mL  5-40 mL IntraVENous Q8H    sodium chloride (NS) flush 5-40 mL  5-40 mL IntraVENous PRN    0.9% sodium chloride infusion  100 mL/hr IntraVENous CONTINUOUS    acetaminophen (TYLENOL) tablet 650 mg  650 mg Oral Q4H PRN    ondansetron (ZOFRAN) injection 4 mg  4 mg IntraVENous Q6H PRN    methylPREDNISolone ((Solu-MEDROL) 1,000 mg in 0.9% sodium chloride (MBP/ADV) 100 mL  1,000 mg IntraVENous Q24H    famotidine (PEPCID) tablet 20 mg  20 mg Oral BID    glucose chewable tablet 16 g  4 Tab Oral PRN    glucagon (GLUCAGEN) injection 1 mg  1 mg IntraMUSCular PRN    insulin lispro (HUMALOG) injection   SubCUTAneous AC&HS    dextrose 10 % infusion 125-250 mL  125-250 mL IntraVENous PRN       Imaging:  MRI Results (most recent):  Results from Hospital Encounter encounter on 01/30/20   MRI [facility] SPINE W WO CONT    Narrative INDICATION: History of transverse myelitis now with left eye papilledema and  visual loss    Exam: MRI thoracic spine. No comparisons. Sequences include sagittal and axial  T1 and T2-weighted images. Sagittal STIR. After the intravenous administration  of 15 mL of Dotarem fat-suppressed sagittal and axial T1-weighted images were  obtained. Patient is claustrophobic. There is motion on the study. FINDINGS: Alignment of the thoracic spine is normal. There are no suspicious  marrow lesions or evidence of fracture. Cord signal and enhancement pattern are  within normal limits given motion on the study. There is no significant disc  degeneration, bulge or protrusion. No significant canal or foraminal narrowing. Paraspinal soft tissues are unremarkable. Impression IMPRESSION:  1.  Given motion on No significant past surgical history the study there is no abnormal cord signal or abnormal  enhancement  2. No significant canal or foraminal narrowing           Lab Review   Recent Results (from the past 24 hour(s))   GLUCOSE, POC    Collection Time: 01/31/20  4:53 PM   Result Value Ref Range    Glucose (POC) 129 (H) 65 - 100 mg/dL    Performed by Juan Jose Hines, POC    Collection Time: 01/31/20  9:23 PM   Result Value Ref Range    Glucose (POC) 126 (H) 65 - 100 mg/dL    Performed by Bora Rubio    GLUCOSE, POC    Collection Time: 02/01/20  7:30 AM   Result Value Ref Range    Glucose (POC) 141 (H) 65 - 100 mg/dL    Performed by Leida Junior        Exam:  Visit Vitals  BP (!) 157/91 (BP 1 Location: Left arm, BP Patient Position: At rest)   Pulse 89   Temp 97.4 °F (36.3 °C)   Resp 20   Wt 115.8 kg (255 lb 6.4 oz)   SpO2 99%   BMI 43.84 kg/m²     Gen: Well developed  CV: RRR  Lungs: non labored breathing  Abd: non distending  Neuro: A&O x 3, no dysarthria or aphasia  CN II-XII: PERRL, EOMI, face symmetric, tongue/palate midline  Motor: strength 5/5 all four ext  Sensory: decrease sensation on prior exam   Gait: deferred     Assessment:     Patient Active Problem List   Diagnosis Code    Papilledema of left eye H47.10     Plan:    Chronic hx of transverse myelitis with new onset Optic Neuritis  - vision improved with 3 day course of steroids.  No need for plasma exchange   - Serum anti-MOG, NMO antibody,c-ANCA, pending   -Will discharge on prednisone  taper 60 mg x2 day ,40mg x2 day, 20 mg x 2day, 10mg x2 day, then stop  - Follow up in the clinic in 2 weeks  With Dr. Brinda Mckenna   Signed:  Liz Gann NP  2/1/2020  11:42 AM

## 2020-02-01 NOTE — PROGRESS NOTES
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 Alissa Casillas, for further evaluation of an approximately 2-week history of left eye vision changes and Papilledema  The patient denied any associated headaches, dizziness, chest pains, shortness of breath, palpitations, nausea, vomiting, fevers, chills, bladder or bowel irregularities. Interval history / Subjective:     F/u left eye vision changes. Improved significantly, Neurology running several lab tests. Assessment & Plan:     Left Optic Neuritis: suspect MS  -history of transverse myelitis  -MRI brain  Abnormal enhancement and signal left optic nerve consistent with clinical  suspicion of left optic neuritis. Multiple foci T2 hyperintensity cerebral white matter and pattern consistent with clinical history history of demyelinating disease. No associated abnormal enhancement or other findings of active disease in the brain.  - MRI thoracic spine: NAD  - Solumedrol iv dose 3 today.  Might need 5 doses per Neurology    Obesity -counseled  Anxiety disorder -stable  Regular diet    Code status: FULL CODE  DVT prophylaxis: scd  Care Plan discussed with: Patient/Family  Disposition: TBD     Hospital Problems  Date Reviewed: 2020          Codes Class Noted POA    * (Principal) Papilledema of left eye ICD-10-CM: H47.10  ICD-9-CM: 377.00  2020 Yes            Review of Systems:   A comprehensive review of systems was negative except for that written in the HPI. Vital Signs:    Last 24hrs VS reviewed since prior progress note. Most recent are:  Visit Vitals  BP (!) 157/91 (BP 1 Location: Left arm, BP Patient Position: At rest)   Pulse 89   Temp 97.4 °F (36.3 °C)   Resp 20   Wt 115.8 kg (255 lb 6.4 oz)   SpO2 99%   BMI 43.84 kg/m²       No intake or output data in the 24 hours ending 02/01/20 1054     Physical Examination:     Constitutional:  No acute distress, cooperative, pleasant        Resp:  CTA bilaterally. No wheezing/rhonchi/rales. No accessory muscle use   CV:  Regular rhythm, normal rate, no murmurs, gallops, rubs    GI:  Soft, non distended, non tender. normoactive bowel sounds, no hepatosplenomegaly     Musculoskeletal:  No edema, warm, 2+ pulses throughout    Neurologic:  Moves all extremities. AAOx3,     Skin:  Good turgor, no rashes or ulcers    Data Review:    Review and/or order of clinical lab test      Labs:     Recent Labs     01/31/20  0520 01/30/20  1457   WBC 5.3 6.8   HGB 12.7 12.9   HCT 37.9 39.2    186     Recent Labs     01/31/20  0520 01/30/20  1420    136   K 4.1 4.1    105   CO2 21 25   BUN 8 8   CREA 0.89 0.75   * 81   CA 8.5 8.6     Recent Labs     01/31/20  0520 01/30/20  1420   SGOT 28 34   ALT 38 35    84   TBILI 0.4 0.4   TP 7.4 7.5   ALB 3.1* 3.4*   GLOB 4.3* 4.1*     No results for input(s): INR, PTP, APTT, INREXT, INREXT in the last 72 hours. No results for input(s): FE, TIBC, PSAT, FERR in the last 72 hours. Lab Results   Component Value Date/Time    Folate >20.0 04/24/2017 03:03 PM      No results for input(s): PH, PCO2, PO2 in the last 72 hours. No results for input(s): CPK, CKNDX, TROIQ in the last 72 hours.     No lab exists for component: CPKMB  Lab Results   Component Value Date/Time    Cholesterol, total 170 01/31/2020 05:20 AM    HDL Cholesterol 58 01/31/2020 05:20 AM    LDL, calculated 84.8 01/31/2020 05:20 AM    Triglyceride 136 01/31/2020 05:20 AM    CHOL/HDL Ratio 2.9 01/31/2020 05:20 AM     Lab Results   Component Value Date/Time    Glucose (POC) 141 (H) 02/01/2020 07:30 AM    Glucose (POC) 126 (H) 01/31/2020 09:23 PM    Glucose (POC) 129 (H) 01/31/2020 04:53 PM    Glucose (POC) 156 (H) 01/31/2020 11:34 AM    Glucose (POC) 168 (H) 01/31/2020 06:59 AM     Lab Results   Component Value Date/Time    Color Yellow 04/24/2017 03:08 PM    Appearance Clear 04/24/2017 03:08 PM    pH (UA) 6.5 04/24/2017 03:08 PM    Ketone Negative 04/24/2017 03:08 PM    Bilirubin Negative 04/24/2017 03:08 PM    Nitrites Negative 04/24/2017 03:08 PM    Leukocyte Esterase Negative 04/24/2017 03:08 PM    Bacteria Few 04/24/2017 03:08 PM    WBC None seen 04/24/2017 03:08 PM    RBC 0-2 04/24/2017 03:08 PM         Medications Reviewed:     Current Facility-Administered Medications   Medication Dose Route Frequency    escitalopram oxalate (LEXAPRO) tablet 20 mg  20 mg Oral DAILY    ALPRAZolam (XANAX) tablet 0.25 mg  0.25 mg Oral BID PRN    sodium chloride (NS) flush 5-40 mL  5-40 mL IntraVENous Q8H    sodium chloride (NS) flush 5-40 mL  5-40 mL IntraVENous PRN    0.9% sodium chloride infusion  100 mL/hr IntraVENous CONTINUOUS    acetaminophen (TYLENOL) tablet 650 mg  650 mg Oral Q4H PRN    ondansetron (ZOFRAN) injection 4 mg  4 mg IntraVENous Q6H PRN    methylPREDNISolone ((Solu-MEDROL) 1,000 mg in 0.9% sodium chloride (MBP/ADV) 100 mL  1,000 mg IntraVENous Q24H    famotidine (PEPCID) tablet 20 mg  20 mg Oral BID    glucose chewable tablet 16 g  4 Tab Oral PRN    glucagon (GLUCAGEN) injection 1 mg  1 mg IntraMUSCular PRN    insulin lispro (HUMALOG) injection   SubCUTAneous AC&HS    dextrose 10 % infusion 125-250 mL  125-250 mL IntraVENous PRN     ______________________________________________________________________  EXPECTED LENGTH OF STAY: - - -  ACTUAL LENGTH OF STAY:          2                 Rufino Bello MD

## 2020-02-01 NOTE — DISCHARGE SUMMARY
Discharge Summary       PATIENT ID: Corina Matute  MRN: [id number]   YOB: 1985    DATE OF ADMISSION: 1/30/2020  2:19 PM    DATE OF DISCHARGE: 2/1/2020   PRIMARY CARE PROVIDER: Karo Gonzalez MD     ATTENDING PHYSICIAN: Obdulia Lamas MD  DISCHARGING PROVIDER: Obdulia Lamas MD    To contact this individual call [phone] and ask the  to page. If unavailable ask to be transferred the Adult Hospitalist Department. CONSULTATIONS: IP CONSULT TO NEUROLOGY  IP CONSULT TO NEUROLOGY  IP CONSULT TO HOSPITALIST    PROCEDURES/SURGERIES: * No surgery found *    ADMITTING [address] [place] [address] COURSE:   Treated for optic neuritis, working up for possible demyelinating disease. Will need close f/u with Neurology op. Has low vitamins replacing. Risk of Re-Admission: low/mod  DISCHARGE DIAGNOSES / PLAN:      Left Optic Neuritis: suspect MS  -history of transverse myelitis  -MRI brain 1/30 Abnormal enhancement and signal left optic nerve consistent with clinical  suspicion of left optic neuritis. Multiple foci T2 hyperintensity cerebral white matter and pattern consistent with clinical history history of demyelinating disease. No associated abnormal enhancement or other findings of active disease in the brain.  - MRI thoracic spine: NAD  - Solumedrol iv dose 3 today. Then taper po steroids for dc. Also has very low vitamins b12 and D. Will dc with replacement. F/u with neurology op closely.     Obesity -counseled  Anxiety disorder -stable     FOLLOW UP APPOINTMENTS:    Follow-up Information     Follow up With Specialties Details Why Contact Info      In 1 week  Neurology         ADDITIONAL CARE RECOMMENDATIONS:  Follow up with PCP and neurology    DIET: Resume previous diet    ACTIVITY: Activity as tolerated    DISCHARGE MEDICATIONS:  Current Discharge Medication List      START taking these medications    Details   cyanocobalamin 1,000 mcg tablet Take 1 Tab by mouth daily for 30 days.   Qty: 30 Tab, Refills: 0      ergocalciferol (ERGOCALCIFEROL) 1,250 mcg (50,000 unit) capsule Take 1 Cap by mouth every seven (7) days for 30 days. Qty: 4 Cap, Refills: 0      famotidine (PEPCID) 20 mg tablet Take 1 Tab by mouth two (2) times a day for 30 days. Qty: 60 Tab, Refills: 0      predniSONE (DELTASONE) 10 mg tablet Take 10 mg by mouth See Admin Instructions. Qty: 27 Tab, Refills: 0         CONTINUE these medications which have NOT CHANGED    Details   cetirizine (ZYRTEC) 10 mg tablet Take 10 mg by mouth daily. escitalopram oxalate (LEXAPRO) 10 mg tablet Take 20 mg by mouth daily. norgestimate-ethinyl estradioL ([address] [address],) 0.25-35 mg-mcg tab Take 1 Tab by mouth daily. NOTIFY YOUR PHYSICIAN FOR ANY OF THE FOLLOWING:   Fever over 101 degrees for 24 hours. Chest pain, shortness of breath, fever, chills, nausea, vomiting, diarrhea, change in mentation, falling, weakness, bleeding. Severe pain or pain not relieved by medications. Or, any other signs or symptoms that you may have questions about. DISPOSITION:    Home With:   OT  PT  HH  RN       Long term SNF/Inpatient Rehab   x Independent/assisted living    Hospice    Other:     PATIENT CONDITION AT DISCHARGE:     Functional status    Poor     Deconditioned     Independent      Cognition     Lucid     Forgetful     Dementia      Catheters/lines (plus indication)    [place]     PICC     PEG     None      Code status     Full code     DNR      PHYSICAL EXAMINATION AT DISCHARGE:  Patient seen and examined at bedside, Condition stable, explained discharge and follow up plans. BP (!) 157/91 (BP 1 Location: Left arm, BP Patient Position: At rest)   Pulse 89   Temp 97.4 °F (36.3 °C)   Resp 20   Wt 115.8 kg (255 lb 6.4 oz)   SpO2 99%   BMI 43.84 kg/m²   General:  Alert, oriented, No acute distress  Resp:  No accessory muscle use, Good AE. Neuro:  Grossly normal, vision improved much.  follows commands   CHRONIC MEDICAL DIAGNOSES:  Problem List as of 2/1/2020 Date Reviewed: 1/31/2020          Codes Class Noted - Resolved    * (Principal) Papilledema of left eye ICD-10-CM: H47.10  ICD-9-CM: 377.00  1/30/2020 - Present              36 minutes were spent with the patient on counseling and coordination of care.     Signed:   Maren Domingo MD  2/1/2020  1:25 PM

## 2020-02-01 NOTE — PROGRESS NOTES
Problem: Falls - Risk of  Goal: *Absence of Falls  Description  Document Clide Failing Fall Risk and appropriate interventions in the flowsheet. Outcome: Progressing Towards Goal  Note: Fall Risk Interventions:            Medication Interventions: Patient to call before getting OOB, Teach patient to arise slowly                   Problem: Pain  Goal: *Control of Pain  Outcome: Progressing Towards Goal     Problem: Pressure Injury - Risk of  Goal: *Prevention of pressure injury  Description  Document Edi Scale and appropriate interventions in the flowsheet. Outcome: Progressing Towards Goal  Note: Pressure Injury Interventions:             Activity Interventions: Increase time out of bed, PT/OT evaluation         Nutrition Interventions: Document food/fluid/supplement intake                     Problem: General Medical Care Plan  Goal: *Labs within defined limits  Outcome: Progressing Towards Goal  Goal: *Absence of infection signs and symptoms  Outcome: Progressing Towards Goal  Goal: *Optimal pain control at patient's stated goal  Outcome: Progressing Towards Goal  Goal: *Skin integrity maintained  Outcome: Progressing Towards Goal  Goal: *Fluid volume balance  Outcome: Progressing Towards Goal

## 2020-02-01 NOTE — PROGRESS NOTES
Problem: Falls - Risk of  Goal: *Absence of Falls  Description  Document Sue Krishnamurthy Fall Risk and appropriate interventions in the flowsheet. Outcome: Progressing Towards Goal  Note: Fall Risk Interventions:            Medication Interventions: Evaluate medications/consider consulting pharmacy, Patient to call before getting OOB, Teach patient to arise slowly                   Problem: Patient Education: Go to Patient Education Activity  Goal: Patient/Family Education  Outcome: Progressing Towards Goal     Problem: Pain  Goal: *Control of Pain  Outcome: Progressing Towards Goal     Problem: Patient Education: Go to Patient Education Activity  Goal: Patient/Family Education  Outcome: Progressing Towards Goal     Problem: Pressure Injury - Risk of  Goal: *Prevention of pressure injury  Description  Document Edi Scale and appropriate interventions in the flowsheet. Outcome: Progressing Towards Goal  Note: Pressure Injury Interventions:             Activity Interventions: Increase time out of bed, Pressure redistribution bed/mattress(bed type), PT/OT evaluation         Nutrition Interventions: Document food/fluid/supplement intake                     Problem: Patient Education: Go to Patient Education Activity  Goal: Patient/Family Education  Outcome: Progressing Towards Goal     Problem: General Medical Care Plan  Goal: *Vital signs within specified parameters  Outcome: Progressing Towards Goal  Goal: *Labs within defined limits  Outcome: Progressing Towards Goal  Goal: *Absence of infection signs and symptoms  Outcome: Progressing Towards Goal  Goal: *Optimal pain control at patient's stated goal  Outcome: Progressing Towards Goal  Goal: *Skin integrity maintained  Outcome: Progressing Towards Goal  Goal: *Fluid volume balance  Outcome: Progressing Towards Goal  Goal: *Optimize nutritional status  Outcome: Progressing Towards Goal  Goal: *Anxiety reduced or absent  Outcome: Progressing Towards Goal  Goal: *Progressive mobility and function (eg: ADL's)  Outcome: Progressing Towards Goal     Problem: Patient Education: Go to Patient Education Activity  Goal: Patient/Family Education  Outcome: Progressing Towards Goal

## 2020-02-01 NOTE — PROGRESS NOTES
I have reviewed discharge instructions with the patient. The patient verbalized understanding. PIV and telemetry discontinued. Patient discharged to home via family with prescriptions and belongings.

## 2020-02-01 NOTE — PROGRESS NOTES
Bedside and Verbal shift change report given to [facility] (oncoming nurse) by Claudette Gaskin (offgoing nurse). Report included the following information SBAR, Kardex, Intake/Output, MAR, Recent Results, Cardiac Rhythm NSR and Dual Neuro Assessment.

## 2020-02-03 LAB — AQP4 H2O CHANNEL AB SERPL IA-ACNC: <1.5 U/ML (ref 0–3)

## 2020-02-04 LAB
C-ANCA TITR SER IF: NORMAL TITER
MAG AB, IGM, ANMGLT: <444 BTU (ref 0–999)
MAG IGM AUTO-AB INTERPRETATION: NORMAL
P-ANCA ATYPICAL TITR SER IF: NORMAL TITER
P-ANCA TITR SER IF: NORMAL TITER

## 2020-02-19 ENCOUNTER — TELEPHONE (OUTPATIENT)
Dept: NEUROLOGY | Age: 35
End: 2020-02-19

## 2020-02-19 NOTE — TELEPHONE ENCOUNTER
Message from Reema below, pt of Dr. Gregory Tamez.      ----- Message from Addie Siddiqui sent at 2/19/2020 12:15 PM EST -----  Regarding: Dr. Vivas/Telephone  General Message/Vendor Calls    Caller's first and last name:Sonia Bolden      Reason for call:ER f/up appt      Callback required yes/no and why:yes      Best contact number(s):[phone]      Details to clarify the request:Pt called regarding the status of appt requested on yesterday for an ER f/up, but have not received a call back.       Addie Siddiqui

## 2020-02-20 NOTE — TELEPHONE ENCOUNTER
Spoke with patient and she stated that it was sent to the wrong doctor that she needs to speak with Dr. Merissa Keenan not Hegachris. I explained that it came to the wrong office and I sincerely apologize and I would send it directly to him. She has been trying to get in contact with the office to get an appointment and results and stated I was the first person to call her back and I am not even in the correct office. Could you please call and help her?

## 2020-03-02 ENCOUNTER — OFFICE VISIT (OUTPATIENT)
Dept: NEUROLOGY | Age: 35
End: 2020-03-02

## 2020-03-02 VITALS
SYSTOLIC BLOOD PRESSURE: 122 MMHG | HEIGHT: 64 IN | DIASTOLIC BLOOD PRESSURE: 82 MMHG | OXYGEN SATURATION: 98 % | RESPIRATION RATE: 16 BRPM | WEIGHT: 243 LBS | HEART RATE: 93 BPM | BODY MASS INDEX: 41.48 KG/M2

## 2020-03-02 DIAGNOSIS — G37.9 DEMYELINATING DISEASE OF CENTRAL NERVOUS SYSTEM (HCC): Primary | ICD-10-CM

## 2020-03-02 NOTE — PROGRESS NOTES
Neurology Clinic Follow up Note    Patient ID:  Flora Rowley  [id number]  27 y.o.  1985      Ms. Javan Ibanez is here for follow up today of  Chief Complaint   Patient presents with    Neurologic Problem          Last Appointment With Me:  Visit date not found       Interval History: In the interim from prior hospitalization, the patient reports that the visual symptoms in her left eye have improved approximately 80 percent. Otherwise denies any new symptoms, recapitulating her history of prior transverse myelitis approximately 13 years ago and right eye vision loss before that. We again discussed her course, diagnostics to this point, including presumptive diagnosis of multiple sclerosis but also that anti-MAG not anti-MOG had been sent previously, with a need to repeat this. We also briefly discussed medication options with further, specific discussion pending results of this studies. PMHx/ PSHx/ FHx/ SHx:  Reviewed and unchanged previous visit. ROS:  Comprehensive review of systems negative except for as noted above. Objective:       Meds:  Current Outpatient Medications   Medication Sig Dispense Refill    cyanocobalamin 1,000 mcg tablet Take 1 Tab by mouth daily for 30 days. 30 Tab 0    ergocalciferol (ERGOCALCIFEROL) 1,250 mcg (50,000 unit) capsule Take 1 Cap by mouth every seven (7) days for 30 days. 4 Cap 0    famotidine (PEPCID) 20 mg tablet Take 1 Tab by mouth two (2) times a day for 30 days. 60 Tab 0    cetirizine (ZYRTEC) 10 mg tablet Take 10 mg by mouth daily.  escitalopram oxalate (LEXAPRO) 10 mg tablet Take 20 mg by mouth daily.  norgestimate-ethinyl estradioL (SPRINTEC, 28,) 0.25-35 mg-mcg tab Take 1 Tab by mouth daily.  predniSONE (DELTASONE) 10 mg tablet Take 10 mg by mouth See Admin Instructions.  27 Tab 0       Exam:  Visit Vitals  /82   Pulse 93   Resp 16   Ht 5' 4\" (1.626 m)   Wt 110.2 kg (243 lb)   LMP  (LMP Unknown)   SpO2 98%   BMI 41.71 kg/m² Physical examination:  GEN: Pleasant female appearing moderately distressed over her presumptive diagnosis, tearful  HEENT: NC/AT, anicteric sclera, no conjunctival pallor noted  CV: Constant S1/S2, regular rate/rhythm  Pul: Equal air entry bilaterally, no wheezing/rhonchi or crackles noted  ABD: Nondistended, nontender, normoactive bowel sounds  EXT: Warm/dry, no peripheral edema, no rashes/petechaie or ecchymoses noted    NEUROLOGICAL EXAM:  General: Awake, alert, speech fluent  CN: PERRL, EOMI without nystagmus, significant visual impairment in right eye, visual fields full in left eye (visual acuity not directed assessed), facial sensation diminished in left V1-V3 and strength are normal and symmetric, hearing is intact to spoken voice, palate and tongue movements are intact and symmetric. Motor: Normal tone, bulk and strength bilaterally. Reflexes: Diminished in upper extremities, 1+ at patella, plantar is mute bilaterally  Coordination: FNF intact in upper extremities. Sensation: Diminished appreciation of light touch in left arm/leg as compared to the right side  Gait: Normal-based and steady. Lab data was reviewed. Radiology images were independently viewed and available reports were reviewed. LABS  Results for orders placed or performed during the hospital encounter of 26/08/68   METABOLIC PANEL, COMPREHENSIVE   Result Value Ref Range    Sodium 136 136 - 145 mmol/L    Potassium 4.1 3.5 - 5.1 mmol/L    Chloride 105 97 - 108 mmol/L    CO2 25 21 - 32 mmol/L    Anion gap 6 5 - 15 mmol/L    Glucose 81 65 - 100 mg/dL    BUN 8 6 - 20 MG/DL    Creatinine 0.75 0.55 - 1.02 MG/DL    BUN/Creatinine ratio 11 (L) 12 - 20      GFR est AA >60 >60 ml/min/1.73m2    GFR est non-AA >60 >60 ml/min/1.73m2    Calcium 8.6 8.5 - 10.1 MG/DL    Bilirubin, total 0.4 0.2 - 1.0 MG/DL    ALT (SGPT) 35 12 - 78 U/L    AST (SGOT) 34 15 - 37 U/L    Alk.  phosphatase 84 45 - 117 U/L    Protein, total 7.5 6.4 - 8.2 g/dL Albumin 3.4 (L) 3.5 - 5.0 g/dL    Globulin 4.1 (H) 2.0 - 4.0 g/dL    A-G Ratio 0.8 (L) 1.1 - 2.2     SAMPLES BEING HELD   Result Value Ref Range    SAMPLES BEING HELD  1 RED, 1 BLUE     COMMENT        Add-on orders for these samples will be processed based on acceptable specimen integrity and analyte stability, which may vary by analyte. CBC WITH AUTOMATED DIFF   Result Value Ref Range    WBC 6.8 3.6 - 11.0 K/uL    RBC 4.29 3.80 - 5.20 M/uL    HGB 12.9 11.5 - 16.0 g/dL    HCT 39.2 35.0 - 47.0 %    MCV 91.4 80.0 - 99.0 FL    MCH 30.1 26.0 - 34.0 PG    MCHC 32.9 30.0 - 36.5 g/dL    RDW 12.4 11.5 - 14.5 %    PLATELET 773 351 - 081 K/uL    MPV 10.7 8.9 - 12.9 FL    NRBC 0.0 0  WBC    ABSOLUTE NRBC 0.00 0.00 - 0.01 K/uL    NEUTROPHILS 57 32 - 75 %    LYMPHOCYTES 32 12 - 49 %    MONOCYTES 8 5 - 13 %    EOSINOPHILS 2 0 - 7 %    BASOPHILS 1 0 - 1 %    IMMATURE GRANULOCYTES 0 0.0 - 0.5 %    ABS. NEUTROPHILS 3.9 1.8 - 8.0 K/UL    ABS. LYMPHOCYTES 2.2 0.8 - 3.5 K/UL    ABS. MONOCYTES 0.5 0.0 - 1.0 K/UL    ABS. EOSINOPHILS 0.1 0.0 - 0.4 K/UL    ABS. BASOPHILS 0.1 0.0 - 0.1 K/UL    ABS. IMM.  GRANS. 0.0 0.00 - 0.04 K/UL    DF SMEAR SCANNED      RBC COMMENTS NORMOCYTIC, NORMOCHROMIC     HEMOGLOBIN A1C WITH EAG   Result Value Ref Range    Hemoglobin A1c 5.0 4.0 - 5.6 %    Est. average glucose 97 mg/dL   SED RATE (ESR)   Result Value Ref Range    Sed rate, automated 2 0 - 20 mm/hr   HCG QL SERUM   Result Value Ref Range    HCG, Ql. NEGATIVE  NEG     ANGIOTENSIN CONVERTING ENZYME   Result Value Ref Range    Angiotensin Converting Enzyme (ACE) 28 14 - 82 U/L   AQUAPORIN-4 RECEPTOR AB   Result Value Ref Range    NMO IgG <1.5 0.0 - 3.0 U/mL   CRP, HIGH SENSITIVITY   Result Value Ref Range    CRP, High sensitivity 4.9 mg/L   METABOLIC PANEL, COMPREHENSIVE   Result Value Ref Range    Sodium 136 136 - 145 mmol/L    Potassium 4.1 3.5 - 5.1 mmol/L    Chloride 108 97 - 108 mmol/L    CO2 21 21 - 32 mmol/L    Anion gap 7 5 - 15 mmol/L    Glucose 144 (H) 65 - 100 mg/dL    BUN 8 6 - 20 MG/DL    Creatinine 0.89 0.55 - 1.02 MG/DL    BUN/Creatinine ratio 9 (L) 12 - 20      GFR est AA >60 >60 ml/min/1.73m2    GFR est non-AA >60 >60 ml/min/1.73m2    Calcium 8.5 8.5 - 10.1 MG/DL    Bilirubin, total 0.4 0.2 - 1.0 MG/DL    ALT (SGPT) 38 12 - 78 U/L    AST (SGOT) 28 15 - 37 U/L    Alk. phosphatase 102 45 - 117 U/L    Protein, total 7.4 6.4 - 8.2 g/dL    Albumin 3.1 (L) 3.5 - 5.0 g/dL    Globulin 4.3 (H) 2.0 - 4.0 g/dL    A-G Ratio 0.7 (L) 1.1 - 2.2     LIPID PANEL   Result Value Ref Range    LIPID PROFILE          Cholesterol, total 170 <200 MG/DL    Triglyceride 136 <150 MG/DL    HDL Cholesterol 58 MG/DL    LDL, calculated 84.8 0 - 100 MG/DL    VLDL, calculated 27.2 MG/DL    CHOL/HDL Ratio 2.9 0.0 - 5.0     CBC W/O DIFF   Result Value Ref Range    WBC 5.3 3.6 - 11.0 K/uL    RBC 4.17 3.80 - 5.20 M/uL    HGB 12.7 11.5 - 16.0 g/dL    HCT 37.9 35.0 - 47.0 %    MCV 90.9 80.0 - 99.0 FL    MCH 30.5 26.0 - 34.0 PG    MCHC 33.5 30.0 - 36.5 g/dL    RDW 12.4 11.5 - 14.5 %    PLATELET 487 485 - 772 K/uL    MPV 10.4 8.9 - 12.9 FL    NRBC 0.0 0  WBC    ABSOLUTE NRBC 0.00 0.00 - 0.01 K/uL   KANDACE, DIRECT, W/REFLEX   Result Value Ref Range    KANDACE, Direct NEGATIVE  NEGATIVE     VITAMIN B12   Result Value Ref Range    Vitamin B12 270 193 - 986 pg/mL   VITAMIN D, 25 HYDROXY   Result Value Ref Range    Vitamin D 25-Hydroxy 10.0 (L) 30 - 100 ng/mL   SAMPLES BEING HELD   Result Value Ref Range    SAMPLES BEING HELD 1PST     COMMENT        Add-on orders for these samples will be processed based on acceptable specimen integrity and analyte stability, which may vary by analyte. ANTI-NEUTROPHIL CYTOPLASMIC AB   Result Value Ref Range    Cytoplasmic (C-ANCA) Ab <1:20 Neg:<1:20 titer    Perinuclear (P-ANCA) <1:20 Neg:<1:20 titer    Atypical pANCA <1:20 Neg:<1:20 titer   MYELIN ASSOC.  GLYCOPROTEIN AB,IGM   Result Value Ref Range    Anti-MAG Ab, IgM <444 0 - 999 BTU   MAG IGM AUTO-AB INTERPRETATION   Result Value Ref Range    MAG IgM Auto-Ab Interpretation Comment     GLUCOSE, POC   Result Value Ref Range    Glucose (POC) 100 65 - 100 mg/dL    Performed by [employer]    GLUCOSE, POC   Result Value Ref Range    Glucose (POC) 168 (H) 65 - 100 mg/dL    Performed by [employer]    GLUCOSE, POC   Result Value Ref Range    Glucose (POC) 156 (H) 65 - 100 mg/dL    Performed by [employer]    GLUCOSE, POC   Result Value Ref Range    Glucose (POC) 129 (H) 65 - 100 mg/dL    Performed by [employer]    GLUCOSE, POC   Result Value Ref Range    Glucose (POC) 126 (H) 65 - 100 mg/dL    Performed by [employer]    GLUCOSE, POC   Result Value Ref Range    Glucose (POC) 141 (H) 65 - 100 mg/dL    Performed by Marvin Hare    GLUCOSE, POC   Result Value Ref Range    Glucose (POC) 119 (H) 65 - 100 mg/dL    Performed by Derek Cody    GLUCOSE, POC   Result Value Ref Range    Glucose (POC) 110 (H) 65 - 100 mg/dL    Performed by Susana Field        IMAGING:  MRI Results (most recent):  Results from Hospital Encounter encounter on 01/30/20   MRI [facility] SPINE W WO CONT    Narrative INDICATION: History of transverse myelitis now with left eye papilledema and  visual loss    Exam: MRI thoracic spine. No comparisons. Sequences include sagittal and axial  T1 and T2-weighted images. Sagittal STIR. After the intravenous administration  of 15 mL of Dotarem fat-suppressed sagittal and axial T1-weighted images were  obtained. Patient is claustrophobic. There is motion on the study. FINDINGS: Alignment of the thoracic spine is normal. There are no suspicious  marrow lesions or evidence of fracture. Cord signal and enhancement pattern are  within normal limits given motion on the study. There is no significant disc  degeneration, bulge or protrusion. No significant canal or foraminal narrowing. Paraspinal soft tissues are unremarkable. Impression IMPRESSION:  1.  Given motion on the study there is no abnormal cord signal or abnormal  enhancement  2.  No significant canal or foraminal narrowing             Assessment:     Flora Rowley is a 27year old RH dominant female with CNS demyelinating disorder NOS with evaluation ongoing        Plan:   CNS demyelinating disorder:  Based on probablity, MRI appearance and separation of time/space, would favor relapsing-remitting MS  However, given severity and recurrence of optic neuritis along with disabiling transverse myelitis, NMO was checked, anti-MAG rather than anti-MOG checked in hospital  KANDACE, ACE unremarkable  Will refer to IR for lumbar puncture, send anti-MOG through [facility] diagnostics  Will return to clinic after LP to evaluate treatment options        Signed:  Babar Palumbo MD  3/2/2020  10:23 AM

## 2020-03-09 DIAGNOSIS — G37.9 DEMYELINATING DISEASE OF CENTRAL NERVOUS SYSTEM (HCC): ICD-10-CM

## 2020-03-13 ENCOUNTER — HOSPITAL ENCOUNTER (OUTPATIENT)
Dept: GENERAL RADIOLOGY | Age: 35
Discharge: HOME | End: 2020-03-13
Attending: PSYCHIATRY & NEUROLOGY
Payer: COMMERCIAL

## 2020-03-13 ENCOUNTER — TELEPHONE (OUTPATIENT)
Dept: NEUROLOGY | Age: 35
End: 2020-03-13

## 2020-03-13 VITALS
OXYGEN SATURATION: 98 % | TEMPERATURE: 98.3 F | SYSTOLIC BLOOD PRESSURE: 132 MMHG | HEART RATE: 68 BPM | RESPIRATION RATE: 16 BRPM | DIASTOLIC BLOOD PRESSURE: 75 MMHG

## 2020-03-13 DIAGNOSIS — G37.9 DEMYELINATING DISEASE (HCC): Primary | ICD-10-CM

## 2020-03-13 DIAGNOSIS — G37.9 DEMYELINATING DISEASE OF CENTRAL NERVOUS SYSTEM (HCC): ICD-10-CM

## 2020-03-13 LAB
APPEARANCE CSF: CLEAR
COLOR CSF: COLORLESS
COMMENT, HOLDF: NORMAL
GLUCOSE CSF-MCNC: 56 MG/DL (ref 40–70)
PROT CSF-MCNC: 35 MG/DL (ref 15–45)
RBC # CSF: 0 /CU MM
SAMPLES BEING HELD,HOLD: NORMAL
TUBE # CSF: 1
TUBE # CSF: 1
TUBE # CSF: 3
WBC # CSF: 3 /CU MM (ref 0–5)

## 2020-03-13 PROCEDURE — 82945 GLUCOSE OTHER FLUID: CPT

## 2020-03-13 PROCEDURE — 82784 ASSAY IGA/IGD/IGG/IGM EACH: CPT

## 2020-03-13 PROCEDURE — 83916 OLIGOCLONAL BANDS: CPT

## 2020-03-13 PROCEDURE — 62270 DX LMBR SPI PNXR: CPT

## 2020-03-13 PROCEDURE — 84157 ASSAY OF PROTEIN OTHER: CPT

## 2020-03-13 PROCEDURE — 86255 FLUORESCENT ANTIBODY SCREEN: CPT

## 2020-03-13 PROCEDURE — 89050 BODY FLUID CELL COUNT: CPT

## 2020-03-13 RX ORDER — SODIUM BICARBONATE 42 MG/ML
1 INJECTION, SOLUTION INTRAVENOUS
Status: DISPENSED | OUTPATIENT
Start: 2020-03-13 | End: 2020-03-13

## 2020-03-13 RX ORDER — LIDOCAINE HYDROCHLORIDE 10 MG/ML
INJECTION, SOLUTION EPIDURAL; INFILTRATION; INTRACAUDAL; PERINEURAL
Status: DISPENSED
Start: 2020-03-13 | End: 2020-03-13

## 2020-03-13 NOTE — DISCHARGE INSTRUCTIONS
[address]    Radiologist:  Dr. Annabel Gutierrez    Date:  3/13/2020        Lumbar Puncture Discharge Instructions      Go home and rest for the next 24 - 48 hours, rest flat or in a reclined position. Limit your activity, including  standing and walking,  to minimize post procedure complications. Increase your fluid intake over the next 24 to 48 hours, try to minimize the use of caffeine products. This will help your hydrate and help your body replace the CSF fluid that was removed for lab testing. Resume your previous diet and prescribed medications. You make take Tylenol, as directed on the label, for pain or headache. Avoid aspirin or ibuprofen (Motrin or Advil) for the next 24 -  48 hours as it may increase your risk of bleeding. You may shower in 24 hours. Wash area with soap and water. Dry well and keep covered with a band aid. Do not soak or swim until the site is completely healed to minimize the risk of infection. If new, severe headache occurs and does not resolve with the recommended instructions above, call your ordering doctor or seek emergency medical treatment for further evaluation. Follow up with your referring physician as previously discussed. All results will be sent to your ordering physician. Side effects of medications used today have been reviewed. Notify us of nausea, itching, hives, dizziness, or anything else out of the ordinary. Should you experience any of these significant changes, please call [phone] between the hours of 7:30 am and 10 pm or [phone] after hours. After hours, ask the  to page the X-ray Technologist, and describe the problem to the technologist.      [facility] [address] [facility]  POST LUMBAR PUNCTURE DISCHARGE INSTRUCTIONS  General Information:    Lumbar Puncture:      A LP is done to help diagnose several disorders, like pseudo tumor, migraines, meningitis, and multiple sclerosis. It involves a puncture (usually in the lower spine) into the sac that protects the spinal column. A sample of the fluid in that space is removed and tested in the lab. Call If:     You should call your Physician and/or the Radiology Nurse if you develop a headache that is not relieved by Tylenol, and worsens when you stand and eases when you lie down, you need to call. You may have developed what is referred to as a spinal headache. Our physician's will probably advise you to be on strict bed rest for 24 hours, to drink lots of fluids and caffeine. If this does not help the head pain, call again the next day. You should call if you have bleeding other than a small spot on your bandage. You should call if you have any numbness, tingling, weakness, fever, chills, urinary retention, severe itching, rash, welts, swelling, or confusion. Follow-Up Instructions: See the doctor who ordered your procedure as he/she has instructed. If you had a Lumbar Puncture or Myelogram, your results should be available to your ordering doctor in 3-5 business days. You can remove your dressing in 24 hours and shower regularly. Do not bathe or swim for 72 hours.     To Reach Us:    Patient Signature:  Date: 3/13/2020  Discharging Nurse: Guanakito Sher RN

## 2020-03-13 NOTE — TELEPHONE ENCOUNTER
Calling to let Dr Ibrahima Martines know that one of the test he ordered will cost. 1,435.19  And it is requiring authorization (on their part), so that test will most likely not be sent out until Monday. In the meantime if you want the other test to be sent off you can call them back and they will proceed with it.

## 2020-03-13 NOTE — PROGRESS NOTES
Pt arrives via stretcher to angio department accompanied by self post LP procedure. All assessments completed and consent was reviewed. Education given was regarding procedure, no sedation, post-procedure care and  management/follow-up. Opportunity for questions was provided and all questions and concerns were addressed. Patient arrived post LP to Angio recovery. Blood work for [employer] panel drawn and taken to the lab by radiology staff. Report given to SHANTAL Conley RN for assumption of care.

## 2020-03-16 ENCOUNTER — TELEPHONE (OUTPATIENT)
Dept: NEUROLOGY | Age: 35
End: 2020-03-16

## 2020-03-16 LAB
ALBUMIN CSF-MCNC: 16 MG/DL (ref 11–48)
ALBUMIN SERPL-MCNC: 3.3 G/DL (ref 3.8–4.8)
IGG CSF-MCNC: 4.4 MG/DL (ref 0–8.6)
IGG SERPL-MCNC: 1036 MG/DL (ref 700–1600)
IGG/ALB CLEAR SER+CSF-RTO: 0.9 (ref 0–0.7)
IGG/ALB CSF: 0.28 {RATIO} (ref 0–0.25)

## 2020-03-16 NOTE — TELEPHONE ENCOUNTER
Returned patient's call, patient verified. Patient stated due to her history of transverse myelitis and possible MS, she is wanting recommendations on what precautions she should be taking. She stated she work at [employer] and one of the offices in [address] [place] [address] had a positive corona virus case, so even though that employee has been quarantined the other employees not exhibiting symptoms \"are being bused down to [facility] and I have some concerns about possibly being exposed. \" Patient inquiring if you would recommend she work from home due to her compromised immune system.

## 2020-03-16 NOTE — TELEPHONE ENCOUNTER
Bryce Slim you for the info. Seems that the labs were sent so we should be good. Jus Babb comment     Noted.

## 2020-03-17 LAB — OLIGOCLONAL BANDS.IT SER+CSF QL: NORMAL

## 2020-03-18 LAB — AQP4 H2O CHANNEL IGG CSF QL: NORMAL

## 2020-03-18 NOTE — TELEPHONE ENCOUNTER
Called and spoke with patient, she verbalized she understood Dr. Víctor Viera response to her concerns. She stated she would call back if she has any additional concerns.

## 2020-03-20 ENCOUNTER — TELEPHONE (OUTPATIENT)
Dept: NEUROLOGY | Age: 35
End: 2020-03-20

## 2020-03-26 ENCOUNTER — VIRTUAL VISIT (OUTPATIENT)
Dept: NEUROLOGY | Age: 35
End: 2020-03-26

## 2020-03-26 VITALS — BODY MASS INDEX: 41.83 KG/M2 | HEIGHT: 64 IN | WEIGHT: 245 LBS

## 2020-03-26 DIAGNOSIS — G35 MULTIPLE SCLEROSIS (HCC): Primary | ICD-10-CM

## 2020-03-26 RX ORDER — DIMETHYL FUMARATE 120 MG/1
CAPSULE ORAL
Qty: 113 CAP | Refills: 0 | Status: SHIPPED | OUTPATIENT
Start: 2020-03-26 | End: 2020-04-16

## 2020-03-26 NOTE — PROGRESS NOTES
Chief Complaint   Patient presents with    Follow-up     after \"spinal tap\" lumbar puncture, done 03/13/2020     Visit Vitals  Ht 5' 4\" (1.626 m)   Wt 111.1 kg (245 lb)   BMI 42.05 kg/m²

## 2020-03-26 NOTE — PROGRESS NOTES
Zoe Handley is a 38 y.o. female who was seen by synchronous (real-time) audio-video technology on 3/26/2020. Consent:  She and/or her healthcare decision maker is aware that this patient-initiated Telehealth encounter is a billable service, with coverage as determined by her insurance carrier. She is aware that she may receive a bill and has provided verbal consent to proceed: Yes    I was my office at [facility] clinic while conducting this encounter. Assessment & Plan:   Zoe Handley is a 38year old RH dominant female with past medical history significant for recurrent episodes of right optic neuritis and transverse myelitis by history, with recent admission to [facility] for left optic neuritis, likely indicative of relapsing-remitting multiple sclerosis (RRMS):    RRMS:  By history as well as current diagnostics, appears most likely diagnosis though anti-MOG antibody was not collected  Discussed presumptive diagnosis with patient, discussed initiation of disease modifying therapy with patient now versus waiting a period given current climate, with patient electing to start medication now  Although patient has suffered infrequent relapses, would plan to start Tecfidera given relative safety profile and moderate efficacy given severity of relapses with risk for rapid disability accumulation  While scant, recommendations at of [facility] as well as other societies would suggest it reasonable to start therapy now  Encouraged patient to contact clinic with any issues, medication intolerance etc  Otherwise will plan to reevaluate in three months and then proceed accordingly     At least 25 minutes were spent during this phone call, during which > 50% of the visit was spent counseling the patient and formulating plan of care. t     712  Subjective: In the interval since her prior visit, no significant events have been noted.  She was able to have her LP puncture performed under Radiology with results demonstrated three oligoclonal bands isolated to the CSF and an elevated IgG index with negative NMO (anti-Mog not collected). Today, given her history, MRI findings and CSF results, we discussed the presumptive diagnosis of RRMS, which patient understood and did not have particular questions about. We also discussed initiation of therapy with patient electing to proceed forward now. Symptomatically, she feels as if the vision in her left eye has returned to approximately 90% of baseline. Prior to Admission medications    Medication Sig Start Date End Date Taking? Authorizing Provider   dimethyl fumarate 120 mg cpDR Take 120 mg by mouth two (2) times a day for 7 days, THEN 240 mg two (2) times a day for 23 days. 3/26/20 4/25/20 Yes Donta Vivas MD   cetirizine (ZYRTEC) 10 mg tablet Take 10 mg by mouth daily. Yes Provider, Historical   escitalopram oxalate (LEXAPRO) 10 mg tablet Take 20 mg by mouth daily. Yes Provider, Historical   norgestimate-ethinyl estradioL ([id number] [id number],) 0.25-35 mg-mcg tab Take 1 Tab by mouth daily. Yes Provider, Historical   predniSONE (DELTASONE) 10 mg tablet Take 10 mg by mouth See Admin Instructions.  2/2/20   Mateo Lan MD     No Known Allergies     PHYSICAL EXAMINATION:   Vital Signs: Unable to be obtained     Constitutional: [x] Appears well-developed and well-nourished [x] No apparent distress         Mental status: [x] Alert and awake  [x] Oriented to person/place/time [x] Able to follow commands       Eyes:   EOM    [x]  Normal       Sclera  [x]  Normal               Discharge [x]  None visible        HENT: [x] Normocephalic, atraumatic     [x] Mouth/Throat: Mucous membranes are moist    External Ears [x] Normal      Neck: [x] No visualized mass      Pulmonary/Chest: [x] Respiratory effort normal   [x] No visualized signs of difficulty breathing or respiratory distress        Musculoskeletal:   [x] Normal gait with no signs of ataxia         [x] Normal range of motion of neck    Neurological:        [x] No Facial Asymmetry (Cranial nerve 7 motor function) (limited exam due to video visit)          [x] No gaze palsy        Unable to assess pupils, fundus        Skin:        [x] No significant exanthematous lesions or discoloration noted on facial skin or upper limbs           Psychiatric:       [x] Normal Affect        [x] No Hallucinations    Other pertinent observable physical exam findings:-  None noted    We discussed the expected course, resolution and complications of the diagnosis(es) in detail. Medication risks, benefits, interactions, and alternatives were discussed as indicated. I advised her to contact the office if her condition worsens, changes or fails to improve as anticipated. She expressed understanding with the diagnosis(es) and plan. Pursuant to the emergency declaration under the [facility], Count includes the [facility] waiver authority and the [employer] and [employer] Act, this Virtual  Visit was conducted, with patient's consent, to reduce the patient's risk of exposure to COVID-19 and provide continuity of care for an established patient. Services were provided through a video synchronous discussion virtually to substitute for in-person clinic visit.     Shama Cabrera MD

## 2020-04-01 ENCOUNTER — TELEPHONE (OUTPATIENT)
Dept: NEUROLOGY | Age: 35
End: 2020-04-01

## 2020-04-15 ENCOUNTER — TELEPHONE (OUTPATIENT)
Dept: NEUROLOGY | Age: 35
End: 2020-04-15

## 2020-04-15 NOTE — TELEPHONE ENCOUNTER
Re: Nicholas Lagos a phone call from [facility] trying to figure out why they keep getting a rejection for medication. After [facility] s/w the pharmacist at [facility] she has confirmed that there is a valid PA on file for both the 120mg and 240mg capsules, but they have having difficulty with how the prescription was written. Request that nurse call the pharmacy at [phone] to clarify what is needed, but after the conversation with [facility], it looks like we need two scripts sent together, One for 120mg quantity of 14 caps for 7 days and a second for 240mg quantity of 60 caps for 30 days.

## 2020-04-16 RX ORDER — DIMETHYL FUMARATE 240 MG/1
240 CAPSULE ORAL 2 TIMES DAILY
Qty: 60 CAP | Refills: 1 | Status: SHIPPED | OUTPATIENT
Start: 2020-04-16 | End: 2021-05-21 | Stop reason: ALTCHOICE

## 2020-04-16 RX ORDER — DIMETHYL FUMARATE 120 MG/1
120 CAPSULE ORAL 2 TIMES DAILY
Qty: 14 CAP | Refills: 0 | Status: SHIPPED | OUTPATIENT
Start: 2020-04-16 | End: 2021-05-21 | Stop reason: ALTCHOICE

## 2020-04-27 NOTE — TELEPHONE ENCOUNTER
[facility] calling wanting our practice to know that they have reached out four times to the pt to deliver the tecifidera and they haven't heard anything back. They have the same number that we have on file.

## 2020-12-18 NOTE — PROGRESS NOTES
----- Message from Luz Maria Colorado LPN sent at 2020  4:08 PM CST -----  Regarding: RE: Open Access Colonoscopy order  [facility],    Please place new Open Access Colonoscopy order. Previous order will  on 2021.    Thank you,      Luz Maria CALERO LPN  [facility]  P:[phone]  F:[phone]       Patient very restless, crying frequently, worried about her health. Blood pressures elevated and patient states she has not been able to get any rest today. MD updated. New order for Xanax 0.25mg.

## 2021-03-25 ENCOUNTER — TELEPHONE (OUTPATIENT)
Dept: NEUROLOGY | Age: 36
End: 2021-03-25

## 2021-03-25 NOTE — TELEPHONE ENCOUNTER
Pt calling wanting Tecfidera sent to new pharmacy-[facility] on [address]. Also calling to find out if she should get the COVID vaccine.  Please call

## 2021-03-26 ENCOUNTER — TELEPHONE (OUTPATIENT)
Dept: NEUROLOGY | Age: 36
End: 2021-03-26

## 2021-03-26 DIAGNOSIS — G35 MULTIPLE SCLEROSIS (HCC): Primary | ICD-10-CM

## 2021-03-26 NOTE — TELEPHONE ENCOUNTER
[address]     Can you let her know since it has been a year, we would just do MRI and check for progression before resuming medications.      Kenneth Dumont

## 2021-03-26 NOTE — TELEPHONE ENCOUNTER
Violet Graham thing can send it in. From an MS standpoint no reason not to get COVID shot, can only speak to this.  Also the patient has not been seen for one year, if she would like ongoing care needs to come back (not virtually) for labs, MRI and general assessment       Jus

## 2021-03-26 NOTE — TELEPHONE ENCOUNTER
Ashish Khoury,     On second thought it does not seem possible that the patient has been taking tecfidera based on when her last prescription was sent/refills associated with it. Can we inquire as to this and what has been going on ?      Juan Antonio Shine

## 2021-03-26 NOTE — TELEPHONE ENCOUNTER
Patient agreed to getting MRI prior to starting medication. I have given her contact information to get it scheduled. Dr. Kindra Godinez, please place order for MRI.  Thanks

## 2021-03-26 NOTE — TELEPHONE ENCOUNTER
I left a message for patient to call back.  Need to see how she has been taking Tecfidera, per Dr. Brice Christy.

## 2021-04-05 ENCOUNTER — HOSPITAL ENCOUNTER (OUTPATIENT)
Dept: MRI IMAGING | Age: 36
Discharge: HOME | End: 2021-04-05
Attending: PSYCHIATRY & NEUROLOGY
Payer: COMMERCIAL

## 2021-04-05 DIAGNOSIS — G35 MULTIPLE SCLEROSIS (HCC): ICD-10-CM

## 2021-04-05 PROCEDURE — A9575 INJ GADOTERATE MEGLUMI 0.1ML: HCPCS | Performed by: PSYCHIATRY & NEUROLOGY

## 2021-04-05 PROCEDURE — 74011250636 HC RX REV CODE- 250/636: Performed by: PSYCHIATRY & NEUROLOGY

## 2021-04-05 PROCEDURE — 70553 MRI BRAIN STEM W/O & W/DYE: CPT

## 2021-04-05 RX ORDER — GADOTERATE MEGLUMINE 376.9 MG/ML
20 INJECTION INTRAVENOUS
Status: COMPLETED | OUTPATIENT
Start: 2021-04-05 | End: 2021-04-05

## 2021-04-05 RX ADMIN — GADOTERATE MEGLUMINE 20 ML: 376.9 INJECTION INTRAVENOUS at 19:11

## 2021-04-12 ENCOUNTER — TELEPHONE (OUTPATIENT)
Dept: NEUROLOGY | Age: 36
End: 2021-04-12

## 2021-05-21 ENCOUNTER — OFFICE VISIT (OUTPATIENT)
Dept: NEUROLOGY | Age: 36
End: 2021-05-21
Payer: COMMERCIAL

## 2021-05-21 ENCOUNTER — TELEPHONE (OUTPATIENT)
Dept: NEUROLOGY | Age: 36
End: 2021-05-21

## 2021-05-21 VITALS
OXYGEN SATURATION: 99 % | DIASTOLIC BLOOD PRESSURE: 80 MMHG | HEART RATE: 86 BPM | SYSTOLIC BLOOD PRESSURE: 140 MMHG | BODY MASS INDEX: 41.83 KG/M2 | RESPIRATION RATE: 18 BRPM | HEIGHT: 64 IN | WEIGHT: 245 LBS

## 2021-05-21 DIAGNOSIS — F90.0 ATTENTION DEFICIT HYPERACTIVITY DISORDER (ADHD), PREDOMINANTLY INATTENTIVE TYPE: Primary | ICD-10-CM

## 2021-05-21 PROCEDURE — 99213 OFFICE O/P EST LOW 20 MIN: CPT | Performed by: PSYCHIATRY & NEUROLOGY

## 2021-05-21 RX ORDER — DIMETHYL FUMARATE 240 MG/1
1 CAPSULE ORAL 2 TIMES DAILY
Qty: 60 CAPSULE | Refills: 2 | Status: SHIPPED | OUTPATIENT
Start: 2021-05-21 | End: 2021-05-22 | Stop reason: SDUPTHER

## 2021-05-21 RX ORDER — DIMETHYL FUMARATE 120 MG/1
120 CAPSULE ORAL 2 TIMES DAILY
Qty: 14 CAPSULE | Refills: 0 | Status: SHIPPED | OUTPATIENT
Start: 2021-05-21 | End: 2021-05-22 | Stop reason: SDUPTHER

## 2021-05-21 RX ORDER — METHYLPHENIDATE HYDROCHLORIDE 5 MG/1
5 TABLET ORAL 2 TIMES DAILY
Qty: 60 TABLET | Refills: 0 | Status: SHIPPED | OUTPATIENT
Start: 2021-05-21 | End: 2021-08-26 | Stop reason: SDUPTHER

## 2021-05-21 RX ORDER — CHOLECALCIFEROL (VITAMIN D3) 125 MCG
CAPSULE ORAL
COMMUNITY

## 2021-05-21 NOTE — PROGRESS NOTES
Ms. Mel Stone presents today to follow up MS. Patient reported fatigue and difficulty concentrating.

## 2021-05-21 NOTE — PROGRESS NOTES
Neurology Clinic Follow up Note    Patient ID:  Dorothy Foy  [id number]  39 y.o.  1985      Ms. Fanny Gallardo is here for follow up today of  Chief Complaint   Patient presents with    Multiple Sclerosis          Last Appointment With Me:  Visit date not found       Interval History: In the interval from prior follow-up, patient has noted significant fatigue which is crippling and constant and feels if she could stay in bed all day. She also notes significant difficulties with attention. Endorses a strong history of ADHD in the family and even that she was tested as a child though never put on medication. Inattention is impacting her ability to do her job in daily life. Does not note any further clinical signs of progressive MS though does endorse some degree of urinary urgency which is becoming more impactful. PMHx/ PSHx/ FHx/ SHx:  Reviewed and unchanged previous visit. ROS:  Comprehensive review of systems negative except for as noted above. Objective:       Meds:  Current Outpatient Medications   Medication Sig Dispense Refill    cholecalciferol, vitamin D3, (Vitamin D3) 50 mcg (2,000 unit) tab Take  by mouth.  methylphenidate HCl (RITALIN) 5 mg tablet Take 1 Tablet by mouth two (2) times a day. Max Daily Amount: 10 mg. Take with breakfast and lunch 60 Tablet 0    dimethyl fumarate (Tecfidera) 120 mg cpDR Take 120 mg by mouth two (2) times a day for 7 days. 14 Capsule 0    dimethyl fumarate (Tecfidera) 240 mg cpDR Take 1 Tablet by mouth two (2) times a day for 30 days. To be taken after prescription for 120mg BID tecfidera is complete  Indications: relapsing form of multiple sclerosis 60 Capsule 2    cetirizine (ZYRTEC) 10 mg tablet Take 10 mg by mouth daily.  escitalopram oxalate (LEXAPRO) 10 mg tablet Take 20 mg by mouth daily.  norgestimate-ethinyl estradioL (SPRINTEC, 28,) 0.25-35 mg-mcg tab Take 1 Tab by mouth daily.       predniSONE (DELTASONE) 10 mg tablet Take 10 mg by mouth See Admin Instructions. (Patient not taking: Reported on 5/21/2021) 27 Tab 0       Exam:  Visit Vitals  BP (!) 140/80   Pulse 86   Resp 18   Ht 5' 4\" (1.626 m)   Wt 245 lb (111.1 kg)   SpO2 99%   BMI 42.05 kg/m²     Physical examination:  Pleasant female sitting comfortably in exam room in no clear distress. HEENT appears grossly unremarkable. Neck appears supple. Cardiovascular demonstrates constant S1/S2 regular rhythm. Pulmonary demonstrates equal entry bilaterally. Extremities are warm/dry. Neurologically patient appears alert and oriented attention appears intact. Speech appears clear. Cranials 2 through 12 appear grossly intact to observation. Motorically patient moves all extremities with equal strength. Remainder of examination is deferred. LABS  Results for orders placed or performed during the hospital encounter of 03/13/20   IGG, CSF INDEX   Result Value Ref Range    IgG, Quant, CSF 4.4 0.0 - 8.6 mg/dL    Albumin, CSF 16 11 - 48 mg/dL    Immunoglobulin G, Qt. 1,036 700 - 1,600 mg/dL    Albumin, serum 3.3 (L) 3.8 - 4.8 g/dL    IgG/Alb ratio, CSF 0.28 (H) 0.00 - 0.25      CSF IgG Index 0.9 (H) 0.0 - 0.7     OLIGOCLONAL BANDS, CSF   Result Value Ref Range    Oligoclonal Bands Comment     CELL COUNT, CSF   Result Value Ref Range    CSF TUBE NO. 3      CSF COLOR COLORLESS COL      CSF APPEARANCE CLEAR CLEAR      CSF RBCs 0 0 /cu mm    CSF WBCs 3 0 - 5 /cu mm   GLUCOSE, CSF   Result Value Ref Range    Tube No. 1      Glucose,CSF 56 40 - 70 MG/DL   PROTEIN, CSF   Result Value Ref Range    Tube No. 1      Protein,CSF 35 15 - 45 MG/DL   SAMPLES BEING HELD   Result Value Ref Range    SAMPLES BEING HELD 1CSF(TUBE 2)     COMMENT        Add-on orders for these samples will be processed based on acceptable specimen integrity and analyte stability, which may vary by analyte.    NMO/AQP4 IGG, CSF   Result Value Ref Range    NMO/AQP4 IgG, CSF < 1:1        IMAGING:  MRI Results (most recent):  Results from Hospital Encounter encounter on 04/05/21    MRI BRAIN W WO CONT    Narrative  EXAM:  MRI BRAIN W WO CONT    INDICATION:    Multiple sclerosis. COMPARISON:  MRI brain 1/30/2020. CONTRAST: 20 ml Dotarem. TECHNIQUE:  Multiplanar multisequence acquisition without and with contrast of the brain. FINDINGS:  There are multiple T2/FLAIR hyperintense lesions in the periventricular and deep  white matter of both cerebral hemispheres, with involvement of the callosal  septal interface. There is a new T2/FLAIR hyperintense lesion in the right  parietal periventricular white matter measuring 16 mm (series 3 image 14). The  remaining lesions are stable. There is no abnormally restricted diffusion or  enhancement associated with these lesions. The ventricles are normal in size and position. There is no acute infarct,  hemorrhage, extra-axial fluid collection, or mass effect. There is no cerebellar  tonsillar herniation. Expected arterial flow-voids are present. The paranasal sinuses, mastoid air cells, and middle ears are clear. The orbital  contents are within normal limits. No significant osseous or scalp lesions are  identified. Impression  1. Multiple white matter lesions with an appearance and pattern consistent with  demyelinating disease. New nonenhancing demyelinating plaque in the right  parietal periventricular white matter since 1/30/2020. Remaining white matter  lesions are stable. No evidence of active demyelinating plaque.     23X          Assessment:     Houston Torrez is a 39year old RH dominant female with past medical history significant for relapsing-remitting multiple sclerosis who presents to [facility] neurology clinic for follow-up following prolonged absence    Plan:   RRMS:  Last MRI obtained in interval from prior visit showed ongoing progression of disease with patient off of therapy  Will attempt to start patient on Tecfidera once again discussed side effects briefly and encourage patient to call questions concerns  Fatigue may be related to MS certainly could be related to depression/stress etc.  Patient appears hyperactive on exam and describes significant inattention which is impacting patient's daily function, will start on methylphenidate for fatigue as well as inattention consider formal testing if not efficacious rather than continued up titration    Follow-up in 3 months      Signed:  Noris Jordan MD  5/21/2021  11:11 AM

## 2021-05-21 NOTE — PATIENT INSTRUCTIONS
PRESCRIPTION REFILL POLICY [facility] Statement to Patients April 1, 2014 In an effort to ensure the large volume of patient prescription refills is processed in the most efficient and expeditious manner, we are asking our patients to assist us by calling your Pharmacy for all prescription refills, this will include also your  Mail Order Pharmacy. The pharmacy will contact our office electronically to continue the refill process. Please do not wait until the last minute to call your pharmacy. We need at least 48 hours (2days) to fill prescriptions. We also encourage you to call your pharmacy before going to  your prescription to make sure it is ready. With regard to controlled substance prescription refill requests (narcotic refills) that need to be picked up at our office, we ask your cooperation by providing us with at least 72 hours (3days) notice that you will need a refill. We will not refill narcotic prescription refill requests after 4:00pm on any weekday, Monday through Thursday, or after 2:00pm on Fridays, or on the weekends. We encourage everyone to explore another way of getting your prescription refill request processed using [employer], our patient web portal through our electronic medical record system. [employer] is an efficient and effective way to communicate your medication request directly to the office and  downloadable as an michela on your smart phone . [employer] also features a review functionality that allows you to view your medication list as well as leave messages for your physician. Are you ready to get connected? If so please review the attatched instructions or speak to any of our staff to get you set up right away! Thank you so much for your cooperation. Should you have any questions please contact our Practice Administrator. The Physicians and Staff,  [facility]

## 2021-05-21 NOTE — TELEPHONE ENCOUNTER
Tecfidera= have to go to [facility] specialty not contracted to do those medications also they want a confirmation that it was sent over to [facility] before they delete it from their chart.  Please call back

## 2021-05-22 DIAGNOSIS — F90.0 ATTENTION DEFICIT HYPERACTIVITY DISORDER (ADHD), PREDOMINANTLY INATTENTIVE TYPE: ICD-10-CM

## 2021-05-22 RX ORDER — DIMETHYL FUMARATE 120 MG/1
120 CAPSULE ORAL 2 TIMES DAILY
Qty: 14 CAPSULE | Refills: 0 | Status: SHIPPED | OUTPATIENT
Start: 2021-05-22 | End: 2021-12-09

## 2021-05-22 RX ORDER — DIMETHYL FUMARATE 240 MG/1
1 CAPSULE ORAL 2 TIMES DAILY
Qty: 60 CAPSULE | Refills: 2 | Status: SHIPPED | OUTPATIENT
Start: 2021-05-22 | End: 2022-01-18 | Stop reason: SDUPTHER

## 2021-06-01 ENCOUNTER — TELEPHONE (OUTPATIENT)
Dept: NEUROLOGY | Age: 36
End: 2021-06-01

## 2021-06-01 NOTE — TELEPHONE ENCOUNTER
Re: Methylphenidate (ritalin)    Rcvd fax from pharmacy requesting PA    Created and Submitted PA through [address] and awaiting update.

## 2021-06-01 NOTE — TELEPHONE ENCOUNTER
Re: Dimethyl Fumarate (tecfidera)    Rcvd fax from [employer] dated 05/24/2021. See this PA request is for  Preferred drug. Answered and faxed with progress notes to [phone]. Copy of request/fax conf scanned to chart.

## 2021-08-26 DIAGNOSIS — F90.0 ATTENTION DEFICIT HYPERACTIVITY DISORDER (ADHD), PREDOMINANTLY INATTENTIVE TYPE: ICD-10-CM

## 2021-08-26 RX ORDER — METHYLPHENIDATE HYDROCHLORIDE 5 MG/1
5 TABLET ORAL 2 TIMES DAILY
Qty: 60 TABLET | Refills: 0 | Status: SHIPPED | OUTPATIENT
Start: 2021-08-26 | End: 2021-12-09 | Stop reason: SDUPTHER

## 2021-08-26 NOTE — TELEPHONE ENCOUNTER
Requested Prescriptions     Pending Prescriptions Disp Refills    methylphenidate HCl (RITALIN) 5 mg tablet 60 Tablet 0     Sig: Take 1 Tablet by mouth two (2) times a day. Max Daily Amount: 10 mg.  Take with breakfast and lunch

## 2021-09-03 ENCOUNTER — TELEPHONE (OUTPATIENT)
Dept: NEUROLOGY | Age: 36
End: 2021-09-03

## 2021-09-03 NOTE — TELEPHONE ENCOUNTER
Re: Ritalin    Rcvd PA request through [facility], Key# [id number]. Submitted info and jonas PA approval effective 09/03/21-09/02/22 scanned letter to chart and called pharmacy and left v/m.

## 2021-11-17 DIAGNOSIS — F90.0 ATTENTION DEFICIT HYPERACTIVITY DISORDER (ADHD), PREDOMINANTLY INATTENTIVE TYPE: ICD-10-CM

## 2021-11-19 RX ORDER — METHYLPHENIDATE HYDROCHLORIDE 5 MG/1
5 TABLET ORAL 2 TIMES DAILY
Qty: 60 TABLET | Refills: 0 | OUTPATIENT
Start: 2021-11-19

## 2021-11-19 NOTE — TELEPHONE ENCOUNTER
MsHenry Ashton did not show for last appointment if I recall correctly. Either needs to follow up every six months to receive ongoing stimulant medications.

## 2021-12-09 ENCOUNTER — OFFICE VISIT (OUTPATIENT)
Dept: NEUROLOGY | Age: 36
End: 2021-12-09
Payer: COMMERCIAL

## 2021-12-09 VITALS
OXYGEN SATURATION: 97 % | WEIGHT: 231 LBS | HEIGHT: 64 IN | DIASTOLIC BLOOD PRESSURE: 88 MMHG | SYSTOLIC BLOOD PRESSURE: 138 MMHG | HEART RATE: 94 BPM | BODY MASS INDEX: 39.44 KG/M2

## 2021-12-09 DIAGNOSIS — F90.0 ATTENTION DEFICIT HYPERACTIVITY DISORDER (ADHD), PREDOMINANTLY INATTENTIVE TYPE: ICD-10-CM

## 2021-12-09 DIAGNOSIS — G35 MULTIPLE SCLEROSIS (HCC): Primary | ICD-10-CM

## 2021-12-09 PROCEDURE — 99213 OFFICE O/P EST LOW 20 MIN: CPT | Performed by: PSYCHIATRY & NEUROLOGY

## 2021-12-09 RX ORDER — CYCLOBENZAPRINE HCL 10 MG
TABLET ORAL
COMMUNITY
Start: 2021-09-09 | End: 2022-09-22

## 2021-12-09 RX ORDER — MELOXICAM 15 MG/1
TABLET ORAL
COMMUNITY
Start: 2021-10-26

## 2021-12-09 RX ORDER — PROPRANOLOL HYDROCHLORIDE 60 MG/1
CAPSULE, EXTENDED RELEASE ORAL
COMMUNITY
Start: 2021-11-24

## 2021-12-09 RX ORDER — METAXALONE 800 MG/1
TABLET ORAL
COMMUNITY
Start: 2021-10-26 | End: 2022-09-22

## 2021-12-09 RX ORDER — METHYLPHENIDATE HYDROCHLORIDE 5 MG/1
5 TABLET ORAL DAILY
Qty: 30 TABLET | Refills: 0 | Status: SHIPPED | OUTPATIENT
Start: 2021-12-09 | End: 2022-01-18 | Stop reason: SDUPTHER

## 2021-12-09 NOTE — PROGRESS NOTES
Neurology Clinic Follow up Note    Patient ID:  Syd Donato  [id number]  39 y.o.  1985      Ms. Marie Villanueva is here for follow up today of  Chief Complaint   Patient presents with    Follow-up     MS, also needs Ritalin refilled          Last Appointment With Me:  8/20/2021       Interval History:     Interval since last visit, patient has been doing well. Had some confusion about medication dosing and was taking Ritalin at night which significantly impacted her sleep but after working with her capital 1 nurse petitioner things have been rearranged and she is been doing well. Is not present sure if she is on medications for multiple sclerosis but denies any symptoms suggestive of a recent MS flare. PMHx/ PSHx/ FHx/ SHx:  Reviewed and unchanged previous visit. ROS:  Comprehensive review of systems negative except for as noted above. Objective:       Meds:  Current Outpatient Medications   Medication Sig Dispense Refill    cyclobenzaprine (FLEXERIL) 10 mg tablet       meloxicam (MOBIC) 15 mg tablet       metaxalone (SKELAXIN) 800 mg tablet       propranolol LA (INDERAL LA) 60 mg SR capsule       methylphenidate HCl (RITALIN) 5 mg tablet Take 1 Tablet by mouth daily. Max Daily Amount: 5 mg. Take with breakfast and lunch 30 Tablet 0    cholecalciferol, vitamin D3, (Vitamin D3) 50 mcg (2,000 unit) tab Take  by mouth.  cetirizine (ZYRTEC) 10 mg tablet Take 10 mg by mouth daily.  escitalopram oxalate (LEXAPRO) 10 mg tablet Take 20 mg by mouth daily.  norgestimate-ethinyl estradioL (SPRINTEC, 28,) 0.25-35 mg-mcg tab Take 1 Tab by mouth daily.  dimethyl fumarate (Tecfidera) 240 mg cpDR Take 1 Tablet by mouth two (2) times a day for 30 days.  To be taken after prescription for 120mg BID tecfidera is complete  Indications: relapsing form of multiple sclerosis 60 Capsule 2       Exam:  Visit Vitals  /88 (BP 1 Location: Right upper arm, BP Patient Position: Sitting)   Pulse 94   Ht 5' 4\" (1.626 m)   Wt 231 lb (104.8 kg)   SpO2 97%   BMI 39.65 kg/m²     Pleasant female resting complaint exam room in no distress. HEENT appears grossly unremarkable neck appears supple. Cardiovascular demonstrates constant S1/S2 regular rhythm. Pulmonary demonstrate sequential bilaterally. Abdomen is nondistended. Remedies are warm/dry. Neurologically, patient appears alert and oriented attention appears intact. Speech is clear, language is fluent. Cranial nerves II through XII are grossly unremarkable. Motorically, patient has normal bulk and tone 5 5 strength in upper and lower extremities. There is some evidence for Tinel sign to the left elbow greater than right sensation is otherwise intact. Coordination is intact in upper extremities. Primary gait and station appears unremarkable. Motor examination is deferred. LABS  Results for orders placed or performed during the hospital encounter of 03/13/20   IGG, CSF INDEX   Result Value Ref Range    IgG, Quant, CSF 4.4 0.0 - 8.6 mg/dL    Albumin, CSF 16 11 - 48 mg/dL    Immunoglobulin G, Qt. 1,036 700 - 1,600 mg/dL    Albumin, serum 3.3 (L) 3.8 - 4.8 g/dL    IgG/Alb ratio, CSF 0.28 (H) 0.00 - 0.25      CSF IgG Index 0.9 (H) 0.0 - 0.7     OLIGOCLONAL BANDS, CSF   Result Value Ref Range    Oligoclonal Bands Comment     CELL COUNT, CSF   Result Value Ref Range    CSF TUBE NO. 3      CSF COLOR COLORLESS COL      CSF APPEARANCE CLEAR CLEAR      CSF RBCs 0 0 /cu mm    CSF WBCs 3 0 - 5 /cu mm   GLUCOSE, CSF   Result Value Ref Range    Tube No. 1      Glucose,CSF 56 40 - 70 MG/DL   PROTEIN, CSF   Result Value Ref Range    Tube No. 1      Protein,CSF 35 15 - 45 MG/DL   SAMPLES BEING HELD   Result Value Ref Range    SAMPLES BEING HELD 1CSF(TUBE 2)     COMMENT        Add-on orders for these samples will be processed based on acceptable specimen integrity and analyte stability, which may vary by analyte.    NMO/AQP4 IGG, CSF   Result Value Ref Range    NMO/AQP4 IgG, CSF < 1:1        IMAGING:  MRI Results (most recent):  Results from Hospital Encounter encounter on 04/05/21    MRI BRAIN W WO CONT    Narrative  EXAM:  MRI BRAIN W WO CONT    INDICATION:    Multiple sclerosis. COMPARISON:  MRI brain 1/30/2020. CONTRAST: 20 ml Dotarem. TECHNIQUE:  Multiplanar multisequence acquisition without and with contrast of the brain. FINDINGS:  There are multiple T2/FLAIR hyperintense lesions in the periventricular and deep  white matter of both cerebral hemispheres, with involvement of the callosal  septal interface. There is a new T2/FLAIR hyperintense lesion in the right  parietal periventricular white matter measuring 16 mm (series 3 image 14). The  remaining lesions are stable. There is no abnormally restricted diffusion or  enhancement associated with these lesions. The ventricles are normal in size and position. There is no acute infarct,  hemorrhage, extra-axial fluid collection, or mass effect. There is no cerebellar  tonsillar herniation. Expected arterial flow-voids are present. The paranasal sinuses, mastoid air cells, and middle ears are clear. The orbital  contents are within normal limits. No significant osseous or scalp lesions are  identified. Impression  1. Multiple white matter lesions with an appearance and pattern consistent with  demyelinating disease. New nonenhancing demyelinating plaque in the right  parietal periventricular white matter since 1/30/2020. Remaining white matter  lesions are stable. No evidence of active demyelinating plaque.     23X    Assessment:     Loan Kay is a 39year old right-hand-dominant female with history of relapsing rating multiple sclerosis who presents to [facility] neurology clinic for ongoing evaluation and management    Plan:   RRMS:  Symptoms are stable, not clear whether patient is on medication or not stated that she was during nursing assessment with medication reconciliation however for me she really cannot say if she is taking it or not  Patient is to go home and confirm medication if taking we will need updated MRI with and without contrast to evaluate for breakthrough disease activity, if she is not taking it we will need to discuss reasons why and again attempt to start patient on disease modifying therapy  She does not note any relapsing symptoms, notes bilateral paresthesias in her pinky fingers which appears more consistent with a peripheral mono entrapment neuropathy around the elbow  Symptoms of ADHD/fatigue are improved with low-dose methylphenidate    Follow-up in 6 months    Signed:  Alivia Flood MD  12/9/2021  9:15 AM

## 2021-12-09 NOTE — PROGRESS NOTES
Chief Complaint   Patient presents with    Follow-up     MS, also needs Ritalin refilled     Visit Vitals  /88 (BP 1 Location: Right upper arm, BP Patient Position: Sitting)   Pulse 94   Ht 5' 4\" (1.626 m)   Wt 231 lb (104.8 kg)   SpO2 97%   BMI 39.65 kg/m²

## 2022-01-18 DIAGNOSIS — F90.0 ATTENTION DEFICIT HYPERACTIVITY DISORDER (ADHD), PREDOMINANTLY INATTENTIVE TYPE: ICD-10-CM

## 2022-01-18 RX ORDER — METHYLPHENIDATE HYDROCHLORIDE 5 MG/1
5 TABLET ORAL DAILY
Qty: 30 TABLET | Refills: 0 | Status: SHIPPED | OUTPATIENT
Start: 2022-01-18 | End: 2022-03-23 | Stop reason: SDUPTHER

## 2022-01-18 RX ORDER — DIMETHYL FUMARATE 240 MG/1
1 CAPSULE ORAL 2 TIMES DAILY
Qty: 60 CAPSULE | Refills: 2 | Status: SHIPPED | OUTPATIENT
Start: 2022-01-18 | End: 2022-01-19 | Stop reason: SDUPTHER

## 2022-01-18 NOTE — TELEPHONE ENCOUNTER
Patient called for refills  Requested Prescriptions     Pending Prescriptions Disp Refills    methylphenidate HCl (RITALIN) 5 mg tablet 30 Tablet 0     Sig: Take 1 Tablet by mouth daily. Max Daily Amount: 5 mg. Take with breakfast and lunch    dimethyl fumarate (Tecfidera) 240 mg cpDR 60 Capsule 2     Sig: Take 1 Tablet by mouth two (2) times a day for 30 days.  To be taken after prescription for 120mg BID tecfidera is complete  Indications: relapsing form of multiple sclerosis

## 2022-01-19 ENCOUNTER — TELEPHONE (OUTPATIENT)
Dept: NEUROLOGY | Age: 37
End: 2022-01-19

## 2022-01-19 RX ORDER — DIMETHYL FUMARATE 240 MG/1
1 CAPSULE ORAL 2 TIMES DAILY
Qty: 180 CAPSULE | Refills: 1 | Status: SHIPPED | OUTPATIENT
Start: 2022-01-19 | End: 2022-04-19

## 2022-01-19 NOTE — TELEPHONE ENCOUNTER
Pt's needs to discuss two medications with Dr or Nurse. Pt needs clarification on the instructions for one/ & her MS medication needs to be sent to a specialty pharmacy.

## 2022-01-21 NOTE — TELEPHONE ENCOUNTER
Not sure what clarifications are needed until should be once a day dosing for methylphenidate, simply accepted refill request. And the other has already been sent to [facility] speciality pharmacy [facility]) unless it should go elsewhere?      Nam Griffin MD

## 2022-03-19 PROBLEM — H47.10: Status: ACTIVE | Noted: 2020-01-30

## 2022-03-23 DIAGNOSIS — F90.0 ATTENTION DEFICIT HYPERACTIVITY DISORDER (ADHD), PREDOMINANTLY INATTENTIVE TYPE: ICD-10-CM

## 2022-03-23 RX ORDER — METHYLPHENIDATE HYDROCHLORIDE 5 MG/1
5 TABLET ORAL DAILY
Qty: 30 TABLET | Refills: 0 | Status: SHIPPED | OUTPATIENT
Start: 2022-03-23 | End: 2022-09-22 | Stop reason: SDUPTHER

## 2022-03-23 NOTE — TELEPHONE ENCOUNTER
Pt req refill for Ritalin  Requested Prescriptions     Pending Prescriptions Disp Refills    methylphenidate HCl (RITALIN) 5 mg tablet 30 Tablet 0     Sig: Take 1 Tablet by mouth daily. Max Daily Amount: 5 mg.  Take with breakfast and lunch

## 2022-07-20 NOTE — CONSULTS
Neurology  Consult  [facility] FNP-C  Neurology NP  [phone]    Patient: Alexandre Upton MRN: [id number]  SSN: [id number]    YOB: 1985  Age: 27 y.o. Sex: female        Chief Complaint: left eye vision changes     Subjective:        Alexandre Upton is a 27 y.o. female who is being seen for left eye vision change. She has a past medical history significant of transverse myelitis with residual right optic nerve atrophy. She reports about 13 years ago she had developed paralysis upper and lower extremities. Around that time she denied any visual disturbance. They had was discharge her with the diagnosis  of transverse myelitis. About 5 years ago she then developed right side visual disturbance. She reports that she could not see out of the right side of her right eye. Majority of her care has been done by her primary care provider, because she was aware there was no treatment for her disease. She did However see a neurologist about 2 years ago, due to burning sensation in her upper left leg and she had developed some urinary incontinence. Her previous neurologist  had ordered several lab and MRI of the brain and cervical spine, and emg however she did not follow through with the complete plan. EMG 2017, Normal study. As for this admission she had developed new onset left-sided visual disturbance 2 weeks ago. She went to go see her ophthalmologist Dr. Hermelinda Godoy who stated that she had papilledema and recommended that she go to the emergency room. MRI of the brain revealed abnormal enhancement and signal left optic nerve consistent with clinical suspicion of left optic neuritis. Multiple foci T2 hyperintensity cerebral white matter . No associated abnormal enhancement or other findings of active disease in the brain. She was started on Solu-Medrol infusion. She has had a total of 2 infusions so far,and has seen improvement with her vision.       Past Medical History:   Diagnosis Date    Acute transverse myelitis (HCC)     Anxiety     Fatigue     Hearing loss     Incontinence     Joint pain     Migraine     Muscle pain     Muscle weakness     Ringing in the ears      No past surgical history on file. No family history on file. Social History     Tobacco Use    Smoking status: Former Smoker    Smokeless tobacco: Never Used   Substance Use Topics    Alcohol use: Yes      Current Facility-Administered Medications   Medication Dose Route Frequency Provider Last Rate Last Dose    escitalopram oxalate (LEXAPRO) tablet 20 mg  20 mg Oral DAILY Sonia Holden MD   20 mg at 01/31/20 0843    . PHARMACY TO SUBSTITUTE PER PROTOCOL (Reordered from: norgestimate-ethinyl estradioL ([facility], ,) 0.25-35 mg-mcg tab)    Per Protocol Sonia Holden MD        sodium chloride (NS) flush 5-40 mL  5-40 mL IntraVENous Q8H Sonia RODAS MD   10 mL at 01/31/20 0557    sodium chloride (NS) flush 5-40 mL  5-40 mL IntraVENous PRN Sonia Holden MD        0.9% sodium chloride infusion  100 mL/hr IntraVENous CONTINUOUS Sonia RODAS  mL/hr at 01/30/20 2213 100 mL/hr at 01/30/20 2213    acetaminophen (TYLENOL) tablet 650 mg  650 mg Oral Q4H PRN Sonia Holden MD        ondansetron [facility]) injection 4 mg  4 mg IntraVENous Q6H PRN Sonia Holden MD        methylPREDNISolone ((Solu-MEDROL) 1,000 mg in 0.9% sodium chloride (MBP/ADV) 100 mL  1,000 mg IntraVENous Q24H Sonia RODAS  mL/hr at 01/30/20 2209 1,000 mg at 01/30/20 2209    famotidine (PEPCID) tablet 20 mg  20 mg Oral BID Sonia Holden MD   20 mg at 01/31/20 3409    glucose chewable tablet 16 g  4 Tab Oral PRN Sonia Holden MD        glucagon [facility]) injection 1 mg  1 mg IntraMUSCular PRN Sonia Holden MD        insulin lispro (HUMALOG) injection   SubCUTAneous AC&HS Sonia Holden MD   Stopped at 01/30/20 2200    dextrose 10 % infusion 125-250 mL  125-250 mL IntraVENous PRN Melissa Calvillo MD            No Known Allergies    Review of Systems:  A comprehensive review of systems was negative except for that written in the History of Present Illness. Objective:     Vitals:    01/30/20 2150 01/31/20 0144 01/31/20 0532 01/31/20 1010   BP: (!) 156/96 143/76 140/81 (!) 168/97   Pulse: 88 83 71 81   Resp: 15 20 17 11   Temp: 98 °F (36.7 °C) 98.7 °F (37.1 °C) 98 °F (36.7 °C) 97.9 °F (36.6 °C)   SpO2: 100% 98% 98% 99%   Weight:  115.4 kg (254 lb 8 oz)          Physical Exam:  GENERAL: alert, cooperative, no distress, appears stated age  EYE: negative, negative findings: lids and lashes normal and pupils equal, round, reactive to light and accomodationEXTREMITIES:  extremities normal, atraumatic, no cyanosis or edema  NEUROLOGIC: positive findings: sensory deficit ( decrease sensations on the left side)       Neurologic Exam:  Mental Status:  Alert and oriented x 4. Appropriate affect, mood and behavior. Language:    Normal fluency, repetition, comprehension and naming. Cranial Nerves:      Pupils equal, round and reactive to light. Visual fields full to confrontation. Extraocular movements intact. Facial sensation intact. Full facial strength, no asymmetry. Hearing intact bilaterally. No dysarthria. Tongue protrudes to midline, palate elevates symmetrically. Shoulder shrug 5/5 bilaterally. Motor:    No pronator drift. Bulk and tone normal.      5/5 power in all extremities proximally and distally. No involuntary movements. Sensation:    Sensation intact throughout to light touch, temperature,     pinprick, vibration, proprioception decrease sensation on the left vs the right    Reflexes:    Reflexes are 1+ at the biceps, triceps, patella and achilles     bilaterally.  Negative Babinskis    Coordination & Gait: Normal.    Recent Results (from the past 24 hour(s))   METABOLIC PANEL, COMPREHENSIVE    Collection Time: 01/30/20  2:20 PM   Result Value Ref Range    Sodium 136 136 - 145 mmol/L    Potassium 4.1 3.5 - 5.1 mmol/L    Chloride 105 97 - 108 mmol/L    CO2 25 21 - 32 mmol/L    Anion gap 6 5 - 15 mmol/L    Glucose 81 65 - 100 mg/dL    BUN 8 6 - 20 MG/DL    Creatinine 0.75 0.55 - 1.02 MG/DL    BUN/Creatinine ratio 11 (L) 12 - 20      GFR est AA >60 >60 ml/min/1.73m2    GFR est non-AA >60 >60 ml/min/1.73m2    Calcium 8.6 8.5 - 10.1 MG/DL    Bilirubin, total 0.4 0.2 - 1.0 MG/DL    ALT (SGPT) 35 12 - 78 U/L    AST (SGOT) 34 15 - 37 U/L    Alk. phosphatase 84 45 - 117 U/L    Protein, total 7.5 6.4 - 8.2 g/dL    Albumin 3.4 (L) 3.5 - 5.0 g/dL    Globulin 4.1 (H) 2.0 - 4.0 g/dL    A-G Ratio 0.8 (L) 1.1 - 2.2     SAMPLES BEING HELD    Collection Time: 01/30/20  2:20 PM   Result Value Ref Range    SAMPLES BEING HELD  1 RED, 1 BLUE     COMMENT        Add-on orders for these samples will be processed based on acceptable specimen integrity and analyte stability, which may vary by analyte. HCG QL SERUM    Collection Time: 01/30/20  2:30 PM   Result Value Ref Range    HCG, Ql. NEGATIVE  NEG     CBC WITH AUTOMATED DIFF    Collection Time: 01/30/20  2:57 PM   Result Value Ref Range    WBC 6.8 3.6 - 11.0 K/uL    RBC 4.29 3.80 - 5.20 M/uL    HGB 12.9 11.5 - 16.0 g/dL    HCT 39.2 35.0 - 47.0 %    MCV 91.4 80.0 - 99.0 FL    MCH 30.1 26.0 - 34.0 PG    MCHC 32.9 30.0 - 36.5 g/dL    RDW 12.4 11.5 - 14.5 %    PLATELET 102 278 - 301 K/uL    MPV 10.7 8.9 - 12.9 FL    NRBC 0.0 0  WBC    ABSOLUTE NRBC 0.00 0.00 - 0.01 K/uL    NEUTROPHILS 57 32 - 75 %    LYMPHOCYTES 32 12 - 49 %    MONOCYTES 8 5 - 13 %    EOSINOPHILS 2 0 - 7 %    BASOPHILS 1 0 - 1 %    IMMATURE GRANULOCYTES 0 0.0 - 0.5 %    ABS. NEUTROPHILS 3.9 1.8 - 8.0 K/UL    ABS. LYMPHOCYTES 2.2 0.8 - 3.5 K/UL    ABS. MONOCYTES 0.5 0.0 - 1.0 K/UL    ABS. EOSINOPHILS 0.1 0.0 - 0.4 K/UL    ABS. BASOPHILS 0.1 0.0 - 0.1 K/UL    ABS. IMM.  GRANS. 0.0 0.00 - 0.04 K/UL    DF SMEAR SCANNED      RBC COMMENTS NORMOCYTIC, NORMOCHROMIC     HEMOGLOBIN A1C WITH EAG    Collection Time: 01/30/20  2:57 PM   Result Value Ref Range    Hemoglobin A1c 5.0 4.0 - 5.6 %    Est. average glucose 97 mg/dL   SED RATE (ESR)    Collection Time: 01/30/20  2:57 PM   Result Value Ref Range    Sed rate, automated 2 0 - 20 mm/hr   GLUCOSE, POC    Collection Time: 01/30/20 10:27 PM   Result Value Ref Range    Glucose (POC) 100 65 - 100 mg/dL    Performed by Marika Mae    CRP, HIGH SENSITIVITY    Collection Time: 01/31/20  5:20 AM   Result Value Ref Range    CRP, High sensitivity 4.9 mg/L   METABOLIC PANEL, COMPREHENSIVE    Collection Time: 01/31/20  5:20 AM   Result Value Ref Range    Sodium 136 136 - 145 mmol/L    Potassium 4.1 3.5 - 5.1 mmol/L    Chloride 108 97 - 108 mmol/L    CO2 21 21 - 32 mmol/L    Anion gap 7 5 - 15 mmol/L    Glucose 144 (H) 65 - 100 mg/dL    BUN 8 6 - 20 MG/DL    Creatinine 0.89 0.55 - 1.02 MG/DL    BUN/Creatinine ratio 9 (L) 12 - 20      GFR est AA >60 >60 ml/min/1.73m2    GFR est non-AA >60 >60 ml/min/1.73m2    Calcium 8.5 8.5 - 10.1 MG/DL    Bilirubin, total 0.4 0.2 - 1.0 MG/DL    ALT (SGPT) 38 12 - 78 U/L    AST (SGOT) 28 15 - 37 U/L    Alk.  phosphatase 102 45 - 117 U/L    Protein, total 7.4 6.4 - 8.2 g/dL    Albumin 3.1 (L) 3.5 - 5.0 g/dL    Globulin 4.3 (H) 2.0 - 4.0 g/dL    A-G Ratio 0.7 (L) 1.1 - 2.2     LIPID PANEL    Collection Time: 01/31/20  5:20 AM   Result Value Ref Range    LIPID PROFILE          Cholesterol, total 170 <200 MG/DL    Triglyceride 136 <150 MG/DL    HDL Cholesterol 58 MG/DL    LDL, calculated 84.8 0 - 100 MG/DL    VLDL, calculated 27.2 MG/DL    CHOL/HDL Ratio 2.9 0.0 - 5.0     CBC W/O DIFF    Collection Time: 01/31/20  5:20 AM   Result Value Ref Range    WBC 5.3 3.6 - 11.0 K/uL    RBC 4.17 3.80 - 5.20 M/uL    HGB 12.7 11.5 - 16.0 g/dL    HCT 37.9 35.0 - 47.0 %    MCV 90.9 80.0 - 99.0 FL    MCH 30.5 26.0 - 34.0 PG    MCHC 33.5 30.0 - 36.5 g/dL RDW 12.4 11.5 - 14.5 %    PLATELET 342 097 - 829 K/uL    MPV 10.4 8.9 - 12.9 FL    NRBC 0.0 0  WBC    ABSOLUTE NRBC 0.00 0.00 - 0.01 K/uL   GLUCOSE, POC    Collection Time: 01/31/20  6:59 AM   Result Value Ref Range    Glucose (POC) 168 (H) 65 - 100 mg/dL    Performed by [employer] [facility]        Imaging:  MRI Results (most recent):  Results from [place] encounter on 01/30/20   MRI BRAIN W WO CONT    Narrative *PRELIMINARY REPORT*  MRI examination of the brain demonstrates atrophy of the right optic nerve but  no evidence of nerve enhancement bilaterally or other acute finding. Preliminary report was provided by Dr. Nicole Hdz, the on-call radiologist, at  6:44 PM  Final report to follow. *END PRELIMINARY REPORT*    *FINAL REPORT BELOW*  EXAM:  MRI BRAIN W WO CONT  INDICATION: Eye pressure and pain behind left eye. Left papilledema. History  transverse myelitis. 2 week history of left eye vision changes with chronic  history that right eye has been \"had for many years\" related to transverse  myelitis. TECHNIQUE:  Whole head sagittal T1, axial FLAIR and T2 weighted images as well as thin  coronal T1-weighted images and thin coronal fat-sat T2 STIR images through the  orbit. Following intravenous infusion of 20 mL of Dotarem repeat thin coronal  fat-sat T1-weighted images of the orbits were obtained as well as thin axial  fat-sat T1-weighted images. Axial diffusion weighted images were also obtained. COMPARISON: None available. FINDINGS:  The right optic nerve is atrophic. The left optic nerve is poorly defined in the mid and anterior orbit extending  up to the back of the globe. There appears to be abnormal enhancement of the  left optic nerve sheath and probably the nerve is well. The extra ocular muscles  are normal and symmetric and there is no evidence of an orbital mass. The optic chiasm and other parasellar structures have no acute abnormality. .  The ventricular size and configuration are normal.   Multiple foci of T2 hyperintensity in the cerebral white matter in a  predominantly periventricular and pericallosal distribution consistent with  clinical history of demyelinating disease. No abnormal enhancement. Flow voids are present in vertebral basilar and carotid artery systems. Other structures at the skull base including paranasal sinuses are also  unremarkable. Impression IMPRESSION:   1. Abnormal enhancement and signal left optic nerve consistent with clinical  suspicion of left optic neuritis. 2. Multiple foci T2 hyperintensity cerebral white matter and pattern consistent  with clinical history history of demyelinating disease. No associated abnormal  enhancement or other findings of active disease in the brain. Assessment:     Hospital Problems  Date Reviewed: 7/10/2017          Codes Class Noted POA    Papilledema of left eye ICD-10-CM: H47.10  ICD-9-CM: 377.00  1/30/2020 Unknown              Plan:   A 27-year-old female who was previously diagnosed with transverse myelitis about 13 years ago presents now with left-sided visual changes. The ophthalmologist had diagnosed her with papilledema. MRI of the brain, however, reveals optic neuritis. She was started on Solu-Medrol 1 mg 2 days ago and we have seen improvement with her vision. Symptoms could be related to MS versus transverse myelitis versus NMO. We will look at the T-spine to evaluate further lesions. In the meantime continue IV Solu-Medrol for 1 more day. Labs were ordered. Hold off on LP. Will follow up tomorrow. ICH score/HH  Activity:  DVT ppx:  Dispo: Thank you for this consult and participating in the care of this patient. I have discussed the diagnosis with the patient and the intended plan as seen in the above orders. Patient is in agreement.   Signed By: Shannan Johnson NP     January 31, 2020 Implemented All Fall Risk Interventions:  [place] to call system. Call bell, personal items and telephone within reach. Instruct patient to call for assistance. Room bathroom lighting operational. Non-slip footwear when patient is off stretcher. Physically safe environment: no spills, clutter or unnecessary equipment. Stretcher in lowest position, wheels locked, appropriate side rails in place. Provide visual cue, wrist band, yellow gown, etc. Monitor gait and stability. Monitor for mental status changes and reorient to person, place, and time. Review medications for side effects contributing to fall risk. Reinforce activity limits and safety measures with patient and family.

## 2022-08-05 NOTE — TELEPHONE ENCOUNTER
Re: Celia Gould approved    Approval rec'd from [facility] via [facility] #[id number]  Approved 03/31/20-03/31/21 normal bowel sounds , no masses palpable , no guarding or rigidity , soft, nontender, nondistended

## 2022-09-22 ENCOUNTER — OFFICE VISIT (OUTPATIENT)
Dept: NEUROLOGY | Age: 37
End: 2022-09-22
Payer: COMMERCIAL

## 2022-09-22 VITALS
HEIGHT: 64 IN | HEART RATE: 66 BPM | WEIGHT: 213 LBS | OXYGEN SATURATION: 96 % | SYSTOLIC BLOOD PRESSURE: 122 MMHG | DIASTOLIC BLOOD PRESSURE: 78 MMHG | BODY MASS INDEX: 36.37 KG/M2 | RESPIRATION RATE: 16 BRPM

## 2022-09-22 DIAGNOSIS — F90.0 ATTENTION DEFICIT HYPERACTIVITY DISORDER (ADHD), PREDOMINANTLY INATTENTIVE TYPE: ICD-10-CM

## 2022-09-22 DIAGNOSIS — G35 MULTIPLE SCLEROSIS (HCC): Primary | ICD-10-CM

## 2022-09-22 PROCEDURE — 99214 OFFICE O/P EST MOD 30 MIN: CPT | Performed by: PSYCHIATRY & NEUROLOGY

## 2022-09-22 RX ORDER — METHYLPHENIDATE HYDROCHLORIDE 5 MG/1
5 TABLET ORAL DAILY
Qty: 30 TABLET | Refills: 0 | Status: SHIPPED | OUTPATIENT
Start: 2022-09-22 | End: 2022-10-22

## 2022-09-22 NOTE — PROGRESS NOTES
Neurology Clinic Follow up Note    Patient ID:  Sofia Guerin  [id number]  44 y.o.  1985      Ms. Senthil Galarza is here for follow up today of  Chief Complaint   Patient presents with    Follow-up     MS: Reports brain fog and not concentrating as much           Last Appointment With Me:  Visit date not found       Interval History: In interval from prior evaluation, patient states that she is really not had either Tecfidera or methylphenidate. States that she is never actually had Tecfidera at all due to apparent pharmacy issues though she is unable to say what the issue is. She also been on methylphenidate due to missed appointments notes ongoing difficulty with concentration and brain fog. Denies any other focal neurologic deficits other than reports of worsening vision in her right eye which apparently is been present for some time. PMHx/ PSHx/ FHx/ SHx:  Reviewed and unchanged previous visit. ROS:  Comprehensive review of systems negative except for as noted above. Objective:       Meds:  Current Outpatient Medications   Medication Sig Dispense Refill    methylphenidate HCl (RITALIN) 5 mg tablet Take 1 Tablet by mouth daily for 30 days. Max Daily Amount: 5 mg. Take with breakfast and lunch 30 Tablet 0    meloxicam (MOBIC) 15 mg tablet       propranolol LA (INDERAL LA) 60 mg SR capsule       cholecalciferol, vitamin D3, 50 mcg (2,000 unit) tab Take  by mouth. cetirizine (ZYRTEC) 10 mg tablet Take 10 mg by mouth daily. escitalopram oxalate (LEXAPRO) 10 mg tablet Take 20 mg by mouth daily. norgestimate-ethinyl estradioL (ORTHO-CYCLEN, SPRINTEC) 0.25-35 mg-mcg tab Take 1 Tab by mouth daily. dimethyl fumarate (Tecfidera) 240 mg cpDR Take 1 Tablet by mouth two (2) times a day for 90 days.  To be taken after prescription for 120mg BID tecfidera is complete  Indications: relapsing form of multiple sclerosis 180 Capsule 1       Exam:  Visit Vitals  /78   Pulse 66   Resp 16   Ht 5' 4\" (1.626 m)   Wt 213 lb (96.6 kg)   SpO2 96%   BMI 36.56 kg/m²     Pleasant female scannable in exam room in no clear distress. HEENT is grossly unrevealing neck appears supple. Cardiopulmonary exams are unremarkable. Abdomen is nontender/nondistended. Extremities are warm/dry. Neurologically, patient appears alert and oriented attention is intact the patient presents with flight of ideas/tangential speech at times. She is also hyperanimated. Language is fluent. Cranials 2 through 12 are grossly unrevealing, no obvious optic disc pallor is noted. Motorically patient moves all extremities equally. Strength appears intact in upper and lower extremities. LABS  Results for orders placed or performed during the hospital encounter of 03/13/20   IGG, CSF INDEX   Result Value Ref Range    IgG, Quant, CSF 4.4 0.0 - 8.6 mg/dL    Albumin, CSF 16 11 - 48 mg/dL    Immunoglobulin G, Qt. 1,036 700 - 1,600 mg/dL    Albumin, serum 3.3 (L) 3.8 - 4.8 g/dL    IgG/Alb ratio, CSF 0.28 (H) 0.00 - 0.25      CSF IgG Index 0.9 (H) 0.0 - 0.7     OLIGOCLONAL BANDS, CSF   Result Value Ref Range    Oligoclonal Bands Comment     CELL COUNT, CSF   Result Value Ref Range    CSF TUBE NO. 3      CSF COLOR COLORLESS COL      CSF APPEARANCE CLEAR CLEAR      CSF RBCs 0 0 /cu mm    CSF WBCs 3 0 - 5 /cu mm   GLUCOSE, CSF   Result Value Ref Range    Tube No. 1      Glucose,CSF 56 40 - 70 MG/DL   PROTEIN, CSF   Result Value Ref Range    Tube No. 1      Protein,CSF 35 15 - 45 MG/DL   SAMPLES BEING HELD   Result Value Ref Range    SAMPLES BEING HELD 1CSF(TUBE 2)     COMMENT        Add-on orders for these samples will be processed based on acceptable specimen integrity and analyte stability, which may vary by analyte.    NMO/AQP4 IGG, CSF   Result Value Ref Range    NMO/AQP4 IgG, CSF < 1:1        IMAGING:  MRI Results (most recent):  Results from Hospital Encounter encounter on 04/05/21    MRI BRAIN W WO CONT    Narrative  EXAM:  MRI BRAIN W WO CONT    INDICATION:    Multiple sclerosis. COMPARISON:  MRI brain 1/30/2020. CONTRAST: 20 ml Dotarem. TECHNIQUE:  Multiplanar multisequence acquisition without and with contrast of the brain. FINDINGS:  There are multiple T2/FLAIR hyperintense lesions in the periventricular and deep  white matter of both cerebral hemispheres, with involvement of the callosal  septal interface. There is a new T2/FLAIR hyperintense lesion in the right  parietal periventricular white matter measuring 16 mm (series 3 image 14). The  remaining lesions are stable. There is no abnormally restricted diffusion or  enhancement associated with these lesions. The ventricles are normal in size and position. There is no acute infarct,  hemorrhage, extra-axial fluid collection, or mass effect. There is no cerebellar  tonsillar herniation. Expected arterial flow-voids are present. The paranasal sinuses, mastoid air cells, and middle ears are clear. The orbital  contents are within normal limits. No significant osseous or scalp lesions are  identified. Impression  1. Multiple white matter lesions with an appearance and pattern consistent with  demyelinating disease. New nonenhancing demyelinating plaque in the right  parietal periventricular white matter since 1/30/2020. Remaining white matter  lesions are stable. No evidence of active demyelinating plaque.     23X    Assessment:     Susan Brown is a 44year old RH dominant female who presents to [facility] Neurology clinic for protracted follow-up of multiple sclerosis    Plan:   Multiple sclerosis:  No evidence of major progression is noted, patient denies any new visual deficits other than progressive vision loss in her right eye which has been present for some time as well as ongoing focus/attentional deficit described as brain fog  Patient has been off all medicine due to lack of follow-up, will resume methylphenidate 5 mg twice daily as this did benefit her before, discussed that given my departure she may ultimately need testing for ADHD but given concern for loss of employment will resume medicine in the meantime  Not clear what issue attack of Naheed Holly is suspect that it needs a prior authorization the patient is unable to tell me  Recommended that she ask the pharmacist and if prior Auth is required to please let us know  Given that its been approximately 1.5 years since last scan we will repeat MRI  Of note historically patient is not the best at follow-up  We will also refer to ophthalmology patient's request      Signed:  Hever Neil MD  9/22/2022  3:06 PM

## 2022-09-22 NOTE — PROGRESS NOTES
Chief Complaint   Patient presents with    Follow-up     MS: Reports brain fog and not concentrating as much      Visit Vitals  /78   Pulse 66   Resp 16   Ht 5' 4\" (1.626 m)   Wt 213 lb (96.6 kg)   SpO2 96%   BMI 36.56 kg/m²

## 2023-02-08 ENCOUNTER — TELEPHONE (OUTPATIENT)
Dept: NEUROLOGY | Age: 38
End: 2023-02-08

## 2023-02-08 RX ORDER — METHYLPHENIDATE HYDROCHLORIDE 5 MG/1
5 TABLET ORAL DAILY
Qty: 30 TABLET | Refills: 0 | Status: CANCELLED | OUTPATIENT
Start: 2023-02-08

## 2023-02-08 RX ORDER — DIMETHYL FUMARATE 240 MG/1
240 CAPSULE ORAL 2 TIMES DAILY
Qty: 180 CAPSULE | Refills: 1 | Status: CANCELLED | OUTPATIENT
Start: 2023-02-08

## 2023-02-08 NOTE — TELEPHONE ENCOUNTER
Called patient. Patient was asking for a refill for her dimethyl fumarate and ritalin. Informed patient that she should of been out of ritalin by oct. Patient stated that she was not consistent with it and would like to start it again. Informed her I can forward this the next provider. Informed her it is up to the doctor's decision for the refills. As for the dimethy furmarate she stated that she couldn't get it due to pharmacy issues but reports she has always been on it.

## 2023-02-08 NOTE — TELEPHONE ENCOUNTER
Patient requesting refill on:    (Tecfidera)240 mg - 1 Tablet 2 Times a Day    (Methylphenidate)5 mg - 1 Tablet by mouth daily but it also says to take with Breakfast and Lunch. No sure if she suppose to split the tablet in half.

## 2023-02-10 NOTE — TELEPHONE ENCOUNTER
Called patient. Informed her that the doctor stated, \"She will need to be seen first. Also, please inform the patient I do not prescribe stimulants. She should see Psychiatry if she needs further refills for medications related to ADD/ADHD. \" Patient stated she is out of her medications. I have placed her on the high priority list for cancellations. Informed her that the  has found an opening for the patient at a different location for a sooner appt. I have given the name, address, number to the [place] location. I will also mail out the appt remainder to patient's home.

## 2023-08-31 ENCOUNTER — OFFICE VISIT (OUTPATIENT)
Age: 38
End: 2023-08-31
Payer: COMMERCIAL

## 2023-08-31 VITALS
OXYGEN SATURATION: 98 % | BODY MASS INDEX: 38.09 KG/M2 | WEIGHT: 215 LBS | SYSTOLIC BLOOD PRESSURE: 120 MMHG | DIASTOLIC BLOOD PRESSURE: 80 MMHG | HEIGHT: 63 IN | HEART RATE: 99 BPM

## 2023-08-31 DIAGNOSIS — G35 MULTIPLE SCLEROSIS (HCC): Primary | ICD-10-CM

## 2023-08-31 DIAGNOSIS — F90.0 ATTENTION-DEFICIT HYPERACTIVITY DISORDER, PREDOMINANTLY INATTENTIVE TYPE: ICD-10-CM

## 2023-08-31 PROCEDURE — 99215 OFFICE O/P EST HI 40 MIN: CPT | Performed by: PSYCHIATRY & NEUROLOGY

## 2023-08-31 RX ORDER — OFATUMUMAB 20 MG/.4ML
20 INJECTION, SOLUTION SUBCUTANEOUS
Qty: 0.4 ML | Refills: 5 | Status: SHIPPED | OUTPATIENT
Start: 2023-08-31

## 2023-08-31 RX ORDER — NORGESTIMATE AND ETHINYL ESTRADIOL 0.25-0.035
1 KIT ORAL DAILY
COMMUNITY

## 2023-08-31 NOTE — PROGRESS NOTES
after-visit summary and questions were answered concerning future plans. I have discussed medication side effects and warnings with the patient as well. The duration of this appointment visit was 40 minutes spent on interview, examination, review of records/labs/imaging, counseling, explanation of diagnosis, planning of further management, documentation and coordination of care.     Signed:  Isa Carrillo DO show

## 2023-09-01 LAB
ALBUMIN SERPL-MCNC: 4.2 G/DL (ref 3.9–4.9)
ALBUMIN/GLOB SERPL: 1.6 {RATIO} (ref 1.2–2.2)
ALP SERPL-CCNC: 78 IU/L (ref 44–121)
ALT SERPL-CCNC: 12 IU/L (ref 0–32)
AST SERPL-CCNC: 18 IU/L (ref 0–40)
BASOPHILS # BLD AUTO: 0.1 X10E3/UL (ref 0–0.2)
BASOPHILS NFR BLD AUTO: 1 %
BILIRUB SERPL-MCNC: 0.2 MG/DL (ref 0–1.2)
BUN SERPL-MCNC: 7 MG/DL (ref 6–20)
BUN/CREAT SERPL: 11 (ref 9–23)
CALCIUM SERPL-MCNC: 9 MG/DL (ref 8.7–10.2)
CHLORIDE SERPL-SCNC: 104 MMOL/L (ref 96–106)
CO2 SERPL-SCNC: 22 MMOL/L (ref 20–29)
CREAT SERPL-MCNC: 0.65 MG/DL (ref 0.57–1)
EGFRCR SERPLBLD CKD-EPI 2021: 115 ML/MIN/1.73
EOSINOPHIL # BLD AUTO: 0.1 X10E3/UL (ref 0–0.4)
EOSINOPHIL NFR BLD AUTO: 3 %
ERYTHROCYTE [DISTWIDTH] IN BLOOD BY AUTOMATED COUNT: 12.3 % (ref 11.7–15.4)
GLOBULIN SER CALC-MCNC: 2.6 G/DL (ref 1.5–4.5)
GLUCOSE SERPL-MCNC: 81 MG/DL (ref 70–99)
HCT VFR BLD AUTO: 41.9 % (ref 34–46.6)
HGB BLD-MCNC: 14 G/DL (ref 11.1–15.9)
IMM GRANULOCYTES # BLD AUTO: 0 X10E3/UL (ref 0–0.1)
IMM GRANULOCYTES NFR BLD AUTO: 0 %
LYMPHOCYTES # BLD AUTO: 2.4 X10E3/UL (ref 0.7–3.1)
LYMPHOCYTES NFR BLD AUTO: 43 %
MCH RBC QN AUTO: 30.9 PG (ref 26.6–33)
MCHC RBC AUTO-ENTMCNC: 33.4 G/DL (ref 31.5–35.7)
MCV RBC AUTO: 93 FL (ref 79–97)
MONOCYTES # BLD AUTO: 0.5 X10E3/UL (ref 0.1–0.9)
MONOCYTES NFR BLD AUTO: 9 %
NEUTROPHILS # BLD AUTO: 2.4 X10E3/UL (ref 1.4–7)
NEUTROPHILS NFR BLD AUTO: 44 %
PLATELET # BLD AUTO: 258 X10E3/UL (ref 150–450)
POTASSIUM SERPL-SCNC: 4.3 MMOL/L (ref 3.5–5.2)
PROT SERPL-MCNC: 6.8 G/DL (ref 6–8.5)
RBC # BLD AUTO: 4.53 X10E6/UL (ref 3.77–5.28)
SODIUM SERPL-SCNC: 138 MMOL/L (ref 134–144)
TSH SERPL DL<=0.005 MIU/L-ACNC: 1.71 UIU/ML (ref 0.45–4.5)
VIT B12 SERPL-MCNC: 397 PG/ML (ref 232–1245)
VZV IGG SER IA-ACNC: 519 INDEX
VZV IGM SER IA-ACNC: <0.91 INDEX (ref 0–0.9)
WBC # BLD AUTO: 5.5 X10E3/UL (ref 3.4–10.8)

## 2023-09-02 LAB
HBV CORE AB SERPL QL IA: NEGATIVE
HBV SURFACE AG SERPL QL IA: NEGATIVE

## 2023-09-08 LAB
ALBUMIN SERPL ELPH-MCNC: 3.6 G/DL (ref 2.9–4.4)
ALBUMIN/GLOB SERPL: 1.1 {RATIO} (ref 0.7–1.7)
ALPHA1 GLOB SERPL ELPH-MCNC: 0.2 G/DL (ref 0–0.4)
ALPHA2 GLOB SERPL ELPH-MCNC: 0.6 G/DL (ref 0.4–1)
B-GLOBULIN SERPL ELPH-MCNC: 1.2 G/DL (ref 0.7–1.3)
GAMMA GLOB SERPL ELPH-MCNC: 1.2 G/DL (ref 0.4–1.8)
GLOBULIN SER CALC-MCNC: 3.2 G/DL (ref 2.2–3.9)
LABORATORY COMMENT REPORT: NORMAL
M PROTEIN SERPL ELPH-MCNC: NORMAL G/DL

## 2023-09-12 ENCOUNTER — TELEPHONE (OUTPATIENT)
Age: 38
End: 2023-09-12

## 2023-09-12 NOTE — TELEPHONE ENCOUNTER
----- Message from [facility] ED, DO sent at 9/12/2023 10:37 AM EDT -----  Labs reviewed without significant abnormalities.  RMD    ----- Message -----  From: Kong Muro Incoming Amb Ref Lab Orders To [facility]  Sent: 9/1/2023   7:38 AM EDT  To: [facility] ED, DO

## 2023-09-12 NOTE — TELEPHONE ENCOUNTER
----- [facility], [facility] sent at 9/12/2023 10:37 AM EDT -----  Labs reviewed without significant abnormalities.  RMD    ----- Message -----  From: Kong Muro Incoming Amb Ref Lab Orders To [facility]  Sent: 9/1/2023   7:38 AM EDT  To: [facility], DO

## 2023-09-12 NOTE — TELEPHONE ENCOUNTER
Called and spoke with the Pt. Per Dr. Vasquez Inch:    Savana Siddiqui reviewed without significant abnormalities.

## 2024-01-09 ENCOUNTER — TELEPHONE (OUTPATIENT)
Age: 39
End: 2024-01-09

## 2024-01-09 NOTE — TELEPHONE ENCOUNTER
Called the Pt. And spoke to the Pt. Answered her questions about the kesimpta.     She will be here Friday to start Kesimpta.

## 2024-01-09 NOTE — TELEPHONE ENCOUNTER
----- Message from Lisa Bowers DO sent at 1/3/2024 12:43 PM EST -----  Regarding: RE: Appt. 1/17/2024  Needs to complete imaging prior to f/u. Please have her schedule imaging. Please confirm she is compliant with DMT/Kesimpta for MSFanny FERNANDEZD  ----- Message -----  From: Jeanette Bauer LPN  Sent: 12/28/2023   1:16 PM EST  To: Lisa Bowers DO  Subject: Appt. 1/17/2024                                  Do not see MRI's completed keep appt.?

## 2024-01-12 ENCOUNTER — CLINICAL DOCUMENTATION (OUTPATIENT)
Age: 39
End: 2024-01-12

## 2024-01-12 NOTE — PROGRESS NOTES
Pt. Came in today to the office for first Kesimpta inj. Also 2 more samples given to take home for week 2 and week 3. She knows to start maintenance dose monthly at week 5.     Lot # [id number] exp. Dec. 2025

## 2024-07-12 ENCOUNTER — TELEPHONE (OUTPATIENT)
Age: 39
End: 2024-07-12

## 2024-07-12 NOTE — TELEPHONE ENCOUNTER
Called the Pt. To remind her of her appt. And gave her the phone number to call and schedule her MRI's.  Keep appt. Please have her call to schedule her MRIs.

## 2024-07-12 NOTE — TELEPHONE ENCOUNTER
----- Message from Lisa Bowers DO sent at 7/11/2024 10:23 AM EDT -----  Regarding: RE: appt. 7/17/2024  Keep appt. Please have her call to schedule her MRIs. ROMARIO  ----- Message -----  From: Jeanette Bauer LPN  Sent: 7/11/2024   8:25 AM EDT  To: Lisa Bowers DO  Subject: appt. 7/17/2024                                  MRI's not completed keep appt. ?

## 2024-07-19 RX ORDER — OFATUMUMAB 20 MG/.4ML
INJECTION, SOLUTION SUBCUTANEOUS
Refills: 0 | OUTPATIENT
Start: 2024-07-19